# Patient Record
Sex: FEMALE | HISPANIC OR LATINO | Employment: FULL TIME | URBAN - METROPOLITAN AREA
[De-identification: names, ages, dates, MRNs, and addresses within clinical notes are randomized per-mention and may not be internally consistent; named-entity substitution may affect disease eponyms.]

---

## 2017-04-21 ENCOUNTER — APPOINTMENT (OUTPATIENT)
Dept: RADIOLOGY | Facility: MEDICAL CENTER | Age: 62
DRG: 193 | End: 2017-04-21
Attending: EMERGENCY MEDICINE
Payer: MEDICAID

## 2017-04-21 ENCOUNTER — HOSPITAL ENCOUNTER (INPATIENT)
Facility: MEDICAL CENTER | Age: 62
LOS: 4 days | DRG: 193 | End: 2017-04-25
Attending: EMERGENCY MEDICINE | Admitting: HOSPITALIST
Payer: MEDICAID

## 2017-04-21 ENCOUNTER — RESOLUTE PROFESSIONAL BILLING HOSPITAL PROF FEE (OUTPATIENT)
Dept: HOSPITALIST | Facility: MEDICAL CENTER | Age: 62
End: 2017-04-21
Payer: MEDICAID

## 2017-04-21 DIAGNOSIS — J40 BRONCHITIS: ICD-10-CM

## 2017-04-21 DIAGNOSIS — J44.1 COPD WITH EXACERBATION (HCC): ICD-10-CM

## 2017-04-21 DIAGNOSIS — R09.02 HYPOXIA: ICD-10-CM

## 2017-04-21 DIAGNOSIS — F17.200 TOBACCO DEPENDENCE: ICD-10-CM

## 2017-04-21 DIAGNOSIS — J44.1 ACUTE EXACERBATION OF CHRONIC OBSTRUCTIVE PULMONARY DISEASE (COPD) (HCC): ICD-10-CM

## 2017-04-21 LAB
ALBUMIN SERPL BCP-MCNC: 4.1 G/DL (ref 3.2–4.9)
ALBUMIN/GLOB SERPL: 1.3 G/DL
ALP SERPL-CCNC: 50 U/L (ref 30–99)
ALT SERPL-CCNC: 16 U/L (ref 2–50)
ANION GAP SERPL CALC-SCNC: 10 MMOL/L (ref 0–11.9)
ANISOCYTOSIS BLD QL SMEAR: ABNORMAL
AST SERPL-CCNC: 12 U/L (ref 12–45)
BASE EXCESS BLDA CALC-SCNC: -3 MMOL/L (ref -4–3)
BASOPHILS # BLD AUTO: 0 % (ref 0–1.8)
BASOPHILS # BLD: 0 K/UL (ref 0–0.12)
BILIRUB SERPL-MCNC: 0.4 MG/DL (ref 0.1–1.5)
BLOOD CULTURE HOLD CXBCH: NORMAL
BNP SERPL-MCNC: 64 PG/ML (ref 0–100)
BODY TEMPERATURE: ABNORMAL CENTIGRADE
BUN SERPL-MCNC: 17 MG/DL (ref 8–22)
CALCIUM SERPL-MCNC: 8.9 MG/DL (ref 8.5–10.5)
CHLORIDE SERPL-SCNC: 103 MMOL/L (ref 96–112)
CO2 SERPL-SCNC: 25 MMOL/L (ref 20–33)
CREAT SERPL-MCNC: 0.51 MG/DL (ref 0.5–1.4)
EKG IMPRESSION: NORMAL
EOSINOPHIL # BLD AUTO: 0.63 K/UL (ref 0–0.51)
EOSINOPHIL NFR BLD: 6.2 % (ref 0–6.9)
ERYTHROCYTE [DISTWIDTH] IN BLOOD BY AUTOMATED COUNT: 43.4 FL (ref 35.9–50)
GFR SERPL CREATININE-BSD FRML MDRD: >60 ML/MIN/1.73 M 2
GLOBULIN SER CALC-MCNC: 3.2 G/DL (ref 1.9–3.5)
GLUCOSE SERPL-MCNC: 143 MG/DL (ref 65–99)
HCO3 BLDA-SCNC: 24 MMOL/L (ref 17–25)
HCT VFR BLD AUTO: 53 % (ref 37–47)
HGB BLD-MCNC: 17 G/DL (ref 12–16)
LACTATE BLD-SCNC: 1.8 MMOL/L (ref 0.5–2)
LYMPHOCYTES # BLD AUTO: 4.15 K/UL (ref 1–4.8)
LYMPHOCYTES NFR BLD: 40.7 % (ref 22–41)
MAGNESIUM SERPL-MCNC: 2.2 MG/DL (ref 1.5–2.5)
MANUAL DIFF BLD: NORMAL
MCH RBC QN AUTO: 29.5 PG (ref 27–33)
MCHC RBC AUTO-ENTMCNC: 32.1 G/DL (ref 33.6–35)
MCV RBC AUTO: 91.9 FL (ref 81.4–97.8)
MICROCYTES BLD QL SMEAR: ABNORMAL
MONOCYTES # BLD AUTO: 0.72 K/UL (ref 0–0.85)
MONOCYTES NFR BLD AUTO: 7.1 % (ref 0–13.4)
MORPHOLOGY BLD-IMP: NORMAL
NEUTROPHILS # BLD AUTO: 4.69 K/UL (ref 2–7.15)
NEUTROPHILS NFR BLD: 46 % (ref 44–72)
NRBC # BLD AUTO: 0 K/UL
NRBC BLD AUTO-RTO: 0 /100 WBC
PCO2 BLDA: 48.8 MMHG (ref 26–37)
PH BLDA: 7.31 [PH] (ref 7.4–7.5)
PLATELET # BLD AUTO: 201 K/UL (ref 164–446)
PLATELET BLD QL SMEAR: NORMAL
PMV BLD AUTO: 10.7 FL (ref 9–12.9)
PO2 BLDA: 53.4 MMHG (ref 64–87)
POTASSIUM SERPL-SCNC: 3.9 MMOL/L (ref 3.6–5.5)
PROT SERPL-MCNC: 7.3 G/DL (ref 6–8.2)
RBC # BLD AUTO: 5.77 M/UL (ref 4.2–5.4)
RBC BLD AUTO: PRESENT
SAO2 % BLDA: 83.3 % (ref 93–99)
SODIUM SERPL-SCNC: 138 MMOL/L (ref 135–145)
TROPONIN I SERPL-MCNC: <0.01 NG/ML (ref 0–0.04)
TSH SERPL DL<=0.005 MIU/L-ACNC: 1.57 UIU/ML (ref 0.3–3.7)
WBC # BLD AUTO: 10.2 K/UL (ref 4.8–10.8)

## 2017-04-21 PROCEDURE — 83605 ASSAY OF LACTIC ACID: CPT

## 2017-04-21 PROCEDURE — 93005 ELECTROCARDIOGRAM TRACING: CPT

## 2017-04-21 PROCEDURE — 85007 BL SMEAR W/DIFF WBC COUNT: CPT

## 2017-04-21 PROCEDURE — 700101 HCHG RX REV CODE 250: Performed by: STUDENT IN AN ORGANIZED HEALTH CARE EDUCATION/TRAINING PROGRAM

## 2017-04-21 PROCEDURE — 94640 AIRWAY INHALATION TREATMENT: CPT

## 2017-04-21 PROCEDURE — 99285 EMERGENCY DEPT VISIT HI MDM: CPT

## 2017-04-21 PROCEDURE — 94760 N-INVAS EAR/PLS OXIMETRY 1: CPT

## 2017-04-21 PROCEDURE — 82803 BLOOD GASES ANY COMBINATION: CPT

## 2017-04-21 PROCEDURE — 700111 HCHG RX REV CODE 636 W/ 250 OVERRIDE (IP): Performed by: HOSPITALIST

## 2017-04-21 PROCEDURE — 87040 BLOOD CULTURE FOR BACTERIA: CPT | Mod: 91

## 2017-04-21 PROCEDURE — 700111 HCHG RX REV CODE 636 W/ 250 OVERRIDE (IP): Performed by: STUDENT IN AN ORGANIZED HEALTH CARE EDUCATION/TRAINING PROGRAM

## 2017-04-21 PROCEDURE — 700105 HCHG RX REV CODE 258: Performed by: EMERGENCY MEDICINE

## 2017-04-21 PROCEDURE — 83880 ASSAY OF NATRIURETIC PEPTIDE: CPT

## 2017-04-21 PROCEDURE — 770020 HCHG ROOM/CARE - TELE (206)

## 2017-04-21 PROCEDURE — 99223 1ST HOSP IP/OBS HIGH 75: CPT | Mod: GC | Performed by: HOSPITALIST

## 2017-04-21 PROCEDURE — A9270 NON-COVERED ITEM OR SERVICE: HCPCS | Performed by: STUDENT IN AN ORGANIZED HEALTH CARE EDUCATION/TRAINING PROGRAM

## 2017-04-21 PROCEDURE — 84443 ASSAY THYROID STIM HORMONE: CPT

## 2017-04-21 PROCEDURE — 700111 HCHG RX REV CODE 636 W/ 250 OVERRIDE (IP): Performed by: EMERGENCY MEDICINE

## 2017-04-21 PROCEDURE — 93005 ELECTROCARDIOGRAM TRACING: CPT | Performed by: EMERGENCY MEDICINE

## 2017-04-21 PROCEDURE — 84484 ASSAY OF TROPONIN QUANT: CPT

## 2017-04-21 PROCEDURE — 700102 HCHG RX REV CODE 250 W/ 637 OVERRIDE(OP): Performed by: EMERGENCY MEDICINE

## 2017-04-21 PROCEDURE — 85027 COMPLETE CBC AUTOMATED: CPT

## 2017-04-21 PROCEDURE — 87205 SMEAR GRAM STAIN: CPT

## 2017-04-21 PROCEDURE — 83735 ASSAY OF MAGNESIUM: CPT

## 2017-04-21 PROCEDURE — 96365 THER/PROPH/DIAG IV INF INIT: CPT

## 2017-04-21 PROCEDURE — A9270 NON-COVERED ITEM OR SERVICE: HCPCS | Performed by: EMERGENCY MEDICINE

## 2017-04-21 PROCEDURE — 96367 TX/PROPH/DG ADDL SEQ IV INF: CPT

## 2017-04-21 PROCEDURE — 700102 HCHG RX REV CODE 250 W/ 637 OVERRIDE(OP): Performed by: STUDENT IN AN ORGANIZED HEALTH CARE EDUCATION/TRAINING PROGRAM

## 2017-04-21 PROCEDURE — 700105 HCHG RX REV CODE 258: Performed by: STUDENT IN AN ORGANIZED HEALTH CARE EDUCATION/TRAINING PROGRAM

## 2017-04-21 PROCEDURE — 71010 DX-CHEST-PORTABLE (1 VIEW): CPT

## 2017-04-21 PROCEDURE — 80053 COMPREHEN METABOLIC PANEL: CPT

## 2017-04-21 PROCEDURE — 700101 HCHG RX REV CODE 250: Performed by: EMERGENCY MEDICINE

## 2017-04-21 PROCEDURE — 96368 THER/DIAG CONCURRENT INF: CPT

## 2017-04-21 PROCEDURE — 87070 CULTURE OTHR SPECIMN AEROBIC: CPT

## 2017-04-21 PROCEDURE — 304561 HCHG STAT O2

## 2017-04-21 RX ORDER — LABETALOL HYDROCHLORIDE 5 MG/ML
10 INJECTION, SOLUTION INTRAVENOUS EVERY 4 HOURS PRN
Status: DISCONTINUED | OUTPATIENT
Start: 2017-04-21 | End: 2017-04-24

## 2017-04-21 RX ORDER — BISACODYL 10 MG
10 SUPPOSITORY, RECTAL RECTAL
Status: DISCONTINUED | OUTPATIENT
Start: 2017-04-21 | End: 2017-04-25 | Stop reason: HOSPADM

## 2017-04-21 RX ORDER — OXYCODONE HYDROCHLORIDE 5 MG/1
2.5 TABLET ORAL
Status: DISCONTINUED | OUTPATIENT
Start: 2017-04-21 | End: 2017-04-23

## 2017-04-21 RX ORDER — ACETAMINOPHEN 325 MG/1
650 TABLET ORAL EVERY 6 HOURS PRN
Status: DISCONTINUED | OUTPATIENT
Start: 2017-04-21 | End: 2017-04-25 | Stop reason: HOSPADM

## 2017-04-21 RX ORDER — IPRATROPIUM BROMIDE AND ALBUTEROL SULFATE 2.5; .5 MG/3ML; MG/3ML
3 SOLUTION RESPIRATORY (INHALATION)
Status: DISCONTINUED | OUTPATIENT
Start: 2017-04-21 | End: 2017-04-22

## 2017-04-21 RX ORDER — AMOXICILLIN 250 MG
2 CAPSULE ORAL 2 TIMES DAILY
Status: DISCONTINUED | OUTPATIENT
Start: 2017-04-21 | End: 2017-04-25 | Stop reason: HOSPADM

## 2017-04-21 RX ORDER — SODIUM CHLORIDE 9 MG/ML
30 INJECTION, SOLUTION INTRAVENOUS ONCE
Status: COMPLETED | OUTPATIENT
Start: 2017-04-21 | End: 2017-04-21

## 2017-04-21 RX ORDER — SODIUM CHLORIDE 9 MG/ML
INJECTION, SOLUTION INTRAVENOUS CONTINUOUS
Status: DISCONTINUED | OUTPATIENT
Start: 2017-04-21 | End: 2017-04-22

## 2017-04-21 RX ORDER — METHYLPREDNISOLONE SODIUM SUCCINATE 125 MG/2ML
125 INJECTION, POWDER, LYOPHILIZED, FOR SOLUTION INTRAMUSCULAR; INTRAVENOUS EVERY 6 HOURS
Status: DISCONTINUED | OUTPATIENT
Start: 2017-04-22 | End: 2017-04-21

## 2017-04-21 RX ORDER — ALBUTEROL SULFATE 90 UG/1
2 AEROSOL, METERED RESPIRATORY (INHALATION)
Status: DISCONTINUED | OUTPATIENT
Start: 2017-04-21 | End: 2017-04-25 | Stop reason: HOSPADM

## 2017-04-21 RX ORDER — OXYCODONE HYDROCHLORIDE 5 MG/1
5 TABLET ORAL
Status: DISCONTINUED | OUTPATIENT
Start: 2017-04-21 | End: 2017-04-23

## 2017-04-21 RX ORDER — ACETAMINOPHEN 325 MG/1
975 TABLET ORAL ONCE
Status: COMPLETED | OUTPATIENT
Start: 2017-04-21 | End: 2017-04-21

## 2017-04-21 RX ORDER — NICOTINE 21 MG/24HR
21 PATCH, TRANSDERMAL 24 HOURS TRANSDERMAL
Status: DISCONTINUED | OUTPATIENT
Start: 2017-04-22 | End: 2017-04-25 | Stop reason: HOSPADM

## 2017-04-21 RX ORDER — IPRATROPIUM BROMIDE AND ALBUTEROL SULFATE 2.5; .5 MG/3ML; MG/3ML
3 SOLUTION RESPIRATORY (INHALATION)
Status: DISCONTINUED | OUTPATIENT
Start: 2017-04-21 | End: 2017-04-21

## 2017-04-21 RX ORDER — POLYETHYLENE GLYCOL 3350 17 G/17G
1 POWDER, FOR SOLUTION ORAL
Status: DISCONTINUED | OUTPATIENT
Start: 2017-04-21 | End: 2017-04-25 | Stop reason: HOSPADM

## 2017-04-21 RX ORDER — CEFTRIAXONE 1 G/1
1 INJECTION, POWDER, FOR SOLUTION INTRAMUSCULAR; INTRAVENOUS ONCE
Status: COMPLETED | OUTPATIENT
Start: 2017-04-21 | End: 2017-04-21

## 2017-04-21 RX ORDER — METHYLPREDNISOLONE SODIUM SUCCINATE 125 MG/2ML
62.5 INJECTION, POWDER, LYOPHILIZED, FOR SOLUTION INTRAMUSCULAR; INTRAVENOUS EVERY 6 HOURS
Status: DISCONTINUED | OUTPATIENT
Start: 2017-04-22 | End: 2017-04-24

## 2017-04-21 RX ORDER — PREDNISONE 20 MG/1
60 TABLET ORAL ONCE
Status: COMPLETED | OUTPATIENT
Start: 2017-04-21 | End: 2017-04-21

## 2017-04-21 RX ADMIN — METHYLPREDNISOLONE SODIUM SUCCINATE 62.5 MG: 125 INJECTION, POWDER, FOR SOLUTION INTRAMUSCULAR; INTRAVENOUS at 22:36

## 2017-04-21 RX ADMIN — IPRATROPIUM BROMIDE 0.5 MG: 0.5 SOLUTION RESPIRATORY (INHALATION) at 18:09

## 2017-04-21 RX ADMIN — SODIUM CHLORIDE 1974 ML: 9 INJECTION, SOLUTION INTRAVENOUS at 18:25

## 2017-04-21 RX ADMIN — ACETAMINOPHEN 975 MG: 325 TABLET, FILM COATED ORAL at 18:23

## 2017-04-21 RX ADMIN — CEFTRIAXONE 1 G: 1 INJECTION, POWDER, FOR SOLUTION INTRAMUSCULAR; INTRAVENOUS at 18:25

## 2017-04-21 RX ADMIN — IPRATROPIUM BROMIDE AND ALBUTEROL SULFATE 3 ML: .5; 3 SOLUTION RESPIRATORY (INHALATION) at 22:38

## 2017-04-21 RX ADMIN — GUAIFENESIN 200 MG: 100 SOLUTION ORAL at 22:36

## 2017-04-21 RX ADMIN — AZITHROMYCIN MONOHYDRATE 500 MG: 500 INJECTION, POWDER, LYOPHILIZED, FOR SOLUTION INTRAVENOUS at 21:04

## 2017-04-21 RX ADMIN — ALBUTEROL SULFATE 15 MG: 5 SOLUTION RESPIRATORY (INHALATION) at 18:09

## 2017-04-21 RX ADMIN — PREDNISONE 60 MG: 20 TABLET ORAL at 18:24

## 2017-04-21 RX ADMIN — SODIUM CHLORIDE: 9 INJECTION, SOLUTION INTRAVENOUS at 22:37

## 2017-04-21 RX ADMIN — DOXYCYCLINE 100 MG: 100 INJECTION, POWDER, LYOPHILIZED, FOR SOLUTION INTRAVENOUS at 18:59

## 2017-04-21 RX ADMIN — STANDARDIZED SENNA CONCENTRATE AND DOCUSATE SODIUM 2 TABLET: 8.6; 5 TABLET, FILM COATED ORAL at 22:36

## 2017-04-21 ASSESSMENT — ENCOUNTER SYMPTOMS
DIZZINESS: 1
ABDOMINAL PAIN: 0
CONSTIPATION: 0
SHORTNESS OF BREATH: 1
BLOOD IN STOOL: 0
VOMITING: 0
MYALGIAS: 1
WEAKNESS: 0
ORTHOPNEA: 0
WHEEZING: 0
BLURRED VISION: 0
DIARRHEA: 0
LOSS OF CONSCIOUSNESS: 0
HEMOPTYSIS: 0
COUGH: 1
SORE THROAT: 0
FEVER: 1
CHILLS: 0
SEIZURES: 0
HEADACHES: 0
NAUSEA: 0
FOCAL WEAKNESS: 0
DIAPHORESIS: 0
HEARTBURN: 0
PALPITATIONS: 0
WEIGHT LOSS: 0
TINGLING: 0
SPUTUM PRODUCTION: 1

## 2017-04-21 ASSESSMENT — PAIN SCALES - GENERAL
PAINLEVEL_OUTOF10: 10
PAINLEVEL_OUTOF10: 0

## 2017-04-21 ASSESSMENT — PATIENT HEALTH QUESTIONNAIRE - PHQ9
SUM OF ALL RESPONSES TO PHQ9 QUESTIONS 1 AND 2: 0
1. LITTLE INTEREST OR PLEASURE IN DOING THINGS: NOT AT ALL
SUM OF ALL RESPONSES TO PHQ QUESTIONS 1-9: 0
2. FEELING DOWN, DEPRESSED, IRRITABLE, OR HOPELESS: NOT AT ALL

## 2017-04-21 ASSESSMENT — COPD QUESTIONNAIRES
DO YOU EVER COUGH UP ANY MUCUS OR PHLEGM?: NO/ONLY WITH OCCASIONAL COLDS OR INFECTIONS
DURING THE PAST 4 WEEKS HOW MUCH DID YOU FEEL SHORT OF BREATH: NONE/LITTLE OF THE TIME
HAVE YOU SMOKED AT LEAST 100 CIGARETTES IN YOUR ENTIRE LIFE: YES
COPD SCREENING SCORE: 4

## 2017-04-21 ASSESSMENT — LIFESTYLE VARIABLES
SUBSTANCE_ABUSE: 0
PACK_YEARS: 45
EVER_SMOKED: YES
EVER_SMOKED: YES
DO YOU DRINK ALCOHOL: NO
DO YOU DRINK ALCOHOL: NO

## 2017-04-21 NOTE — IP AVS SNAPSHOT
4/25/2017    Radha Guerin  [address]  [place] [address]    Dear Radha:    [facility] wants to ensure your discharge home is safe and you or your loved ones have had all of your questions answered regarding your care after you leave the hospital.    Below is a list of resources and contact information should you have any questions regarding your hospital stay, follow-up instructions, or active medical symptoms.    Questions or Concerns Regarding… Contact   Medical Questions Related to Your Discharge  (7 days a week, 8am-5pm) Contact a Nurse Care Coordinator   [phone]   Medical Questions Not Related to Your Discharge  (24 hours a day / 7 days a week)  Contact the Nurse Health Line   [phone]    Medications or Discharge Instructions Refer to your discharge packet   or contact your [facility] Primary Care Provider   [phone]   Follow-up Appointment(s) Schedule your appointment via [employer]   or contact Scheduling [phone]   Billing Review your statement via [employer]  or contact Billing [phone]   Medical Records Review your records via [employer]   or contact Medical Records [phone]     You may receive a telephone call within two days of discharge. This call is to make certain you understand your discharge instructions and have the opportunity to have any questions answered. You can also easily access your medical information, test results and upcoming appointments via the [employer] free online health management tool. You can learn more and sign up at [email or web address]. For assistance setting up your [employer] account, please call [phone].    Once again, we want to ensure your discharge home is safe and that you have a clear understanding of any next steps in your care. If you have any questions or concerns, please do not hesitate to contact us, we are here for you. Thank you for choosing [facility] for your healthcare needs.    Sincerely,    Your [facility] Healthcare Team

## 2017-04-21 NOTE — IP AVS SNAPSHOT
Home Care Instructions                                                                                                                  Name:Radha Guerin  Medical Record Number:[id number]  CSN: [id number]    YOB: 1955   Age: 61 y.o.  Sex: female  HT:1.524 m (5') WT: 65.1 kg (143 lb 8.3 oz)          Admit Date: 4/21/2017     Discharge Date:   Today's Date: 4/25/2017  Attending Doctor:  Marie Lama Team Callister*                  Allergies:  Review of patient's allergies indicates no known allergies.            Discharge Instructions       Discharge Instructions    Discharged to home by car with relative. Discharged via wheelchair, hospital escort: Yes.  Special equipment needed: Oxygen    Be sure to schedule a follow-up appointment with your primary care doctor or any specialists as instructed.     Discharge Plan:   Diet Plan: Discussed  Activity Level: Discussed  Smoking Cessation Offered: Patient Counseled  Confirmed Follow up Appointment: Patient to Call and Schedule Appointment  Confirmed Symptoms Management: Discussed  Medication Reconciliation Updated: Yes  Influenza Vaccine Indication: Patient Refuses    I understand that a diet low in cholesterol, fat, and sodium is recommended for good health. Unless I have been given specific instructions below for another diet, I accept this instruction as my diet prescription.   Other diet: Diabetic    Special Instructions: None    · Is patient discharged on Warfarin / Coumadin?   No     · Is patient Post Blood Transfusion?  No    Cómo evitar los problemas relacionados con la diabetes  (How to Avoid Diabetes Problems)  Usted puede hacer varias cosas para prevenir o disminuir los problemas relacionados con la diabetes. Seguir un plan para la diabetes y cuidarse usted mismo puede reducir el riesgo de complicaciones graves o potencialmente mortales. A continuación, encontrará algunas cosas que puede hacer para prevenir los problemas de la diabetes.  CONTROLE LA  DIABETES  Siga las instrucciones de hood médico, enfermera educadora en diabetes y nutricionista para controlar la enfermedad. Le enseñarán los fundamentos para el cuidado de la diabetes. Le ayudar con las preguntas que pueda tener. Aprenda acerca de la diabetes y a ayesha decisiones saludables en materia de alimentación y actividad física. Controle hood nivel de glucosa en la kane con regularidad. El médico le ayudará a decidir con qué frecuencia debe revisar hood nivel de glucosa en la kane, en función de los objetivos de hood tratamiento y el éxito en cumplirlos.   NO USE NICOTINA  La nicotina y la diabetes son maxine combinación peligrosa. La nicotina aumenta el riesgo de problemas con la diabetes. Si stefani de usar nicotina, reducirá el riesgo de infarto de miocardio, ictus, enfermedades del sistema nervioso y enfermedades renales. Pueden mejorar el colesterol y sylvie niveles de presión arterial. La circulación sanguínea mejorará también. No consuma ningún producto que contenga tabaco, incluidos cigarrillos, tabaco de mascar o cigarrillos electrónicos. Si necesita ayuda para dejar de fumar, hable con el médico.  MANTENGA HOOD PRESIÓN ARTERIAL BAJO CONTROL  El médico determinará cuál debería ser hood presión arterial en función de hood edad, los medicamentos que consuma, el tiempo que hace que tiene diabetes y cualquier otra enfermedad que padezca. La presión arterial consiste en dos números. En general, el objetivo es mantener el número de arriba (presión sistólica) en un valor clifford de 130 y el número de abajo (presión diastólica) en un valor cilfford de 80. Si corresponde, el médico recomendará un objetivo de presión arterial con valores más bajos. La planificación de comidas, los medicamentos y el ejercicio pueden ayudarle a alcanzar sylvie objetivos. Asegúrese de que el médico le mida la presión arterial en cada visita.  MANTENGA LOS NIVELES DE COLESTEROL BAJO CONTROL  Los niveles normales de colesterol ayudan a prevenir  enfermedades del corazón y el ictus. Estos son los mayores problemas de jaswinder para las personas con diabetes. Mantener los niveles de colesterol bajo control también puede ayudar con el flujo sanguíneo. Controle hood nivel de colesterol por lo menos maxine vez al año. Hood médico puede recetarle un medicamento llamado estatina. Las estatinas reducen el colesterol. Si no está tomando maxine estatina, pregúntele a hood médico si debería tomarla. La planificación de comidas, el ejercicio y los medicamentos pueden ayudar a alcanzar sylvie objetivos relacionados con el nivel de colesterol.   PLANIFIQUE Y CUMPLA CON SYLVIE EXAMENES FISICOS Y OCULARES ANUALES  El médico le dirá la frecuencia con la que quiere controlarlo en función de hood plan de tratamiento. Es importante que cumpla con estos controles para identificar rápidamente posibles problemas y puedan evitarse o tratarse las complicaciones.  · En cada visita, hood médico debe pesarlo, medirle la presión arterial y evaluar hood control del nivel de glucosa.  · La hemoglobina A1c debe controlarse:  · Por lo menos dos veces al año si usted está en el nivel adecuado.  · Cada 3 meses si hay cambios en el tratamiento.  · Si usted no está alcanzando sylvie objetivos.  · Los lípidos de la kane deben controlarse anualmente. También hay que controlar anualmente la presencia de proteínas en la orina (microalbuminuria).  · Si tiene diabetes tipo 1, programe un examen de fondo de ojos en el período de 5 años a partir del diagnóstico y después maxine vez al año. Si tiene diabetes tipo 2, programe un examen de fondo de ojos cuando reciba el diagnóstico y después maxine vez al año. Los exámenes posteriores deben hacerse cada 2 o 3 años si trista o más exámenes haji sido normales.  MANTÉNGASE AL DÍA CON LAS VACUNAS  Se recomienda que se vacune contra la gripe todos los años. Además, que se vacune contra la neumonía (vacuna antineumocócica). Si es mayor de 65 años y nunca se vacunó contra la neumonía, esta vacuna  puede administrarse stephen maxine serie de dos vacunas por separado. Pregúntele al médico qué otras vacunas se pueden recomendar.  CUIDE FRANCES PIES   La diabetes puede hacer que el flujo sanguíneo (circulación) en las piernas y los pies sea deficiente. Debido a esto, la piel se torna más delgada, se rompe con facilidad y se jose más lentamente. También puede sufrir un daño en los nervios de las piernas y los pies, lo que disminuye la sensibilidad. Es posible que no advierta las heridas más pequeñas que pueden conducir a infecciones graves. El cuidado de los pies es muy importante.  Se hará maxine inspección visual en cada visita médica de rutina. Estos controles observarán si hay li, lesiones u otros problemas en los pies. Maxine vez por año debe hacerse un examen más intensivo. Phyllis examen incluye la inspección visual y maxine evaluación de los pulsos del pie y la sensibilidad. Usted también debe hacer lo siguiente:  · Examine frances pies todos los días para detectar li, ampollas, callos, uñas encarnadas, y signos de infección, tales stephen enrojecimiento, hinchazón o pus.  · Lave y seque cindy los pies, especialmente entre los dedos.  · Evite sumergir frances pies regularmente en [place].  · Hidrate la piel seca con loción, evitando colocarla entre los dedos.  · Córtese las uñas en línea recta y lime los bordes.  · Evite los zapatos que no calzan cindy o tienen áreas que irritan la piel.  · Evite ir descalzo o con calcetines solamente. Frances pies necesitan protección.  CUIDE FRANCES DIENTES  Las personas con diabetes mal controlada son más propensas a tener enfermedades en las encías (periodontales). Estas infecciones hacen que la diabetes sea difícil de controlar. Las enfermedades periodontales, si se jony sin tratamiento, pueden conducir a la pérdida de dientes. [place] frances dientes dos veces al día, use hilo dental y visite al dentista para los controles y limpieza cada 6 meses o 2 veces al año.  CONSULTE A HELLER MÉDICO SOBRE EL  CONSUMO DE ASPIRINA  Jaclyn aspirina a diario se recomienda para ayudar a prevenir la enfermedad cardiovascular en personas con y sin diabetes. Pregúntele a hood médico si esto lo beneficiaría y cuál es la dosis que le recomienda.  SURJIT DE MANERA RESPONSABLE  Las cantidades moderadas de alcohol (menos de 1 bebida al día para mujeres adultas y menores de 2 bebidas al día para hombres adultos) tienen un mínimo efecto sobre la glucosa en la kane si se ingiere con los alimentos. Es importante comer alimentos cuando se shyam alcohol para evitar la hipoglucemia. Las personas deben evitar el alcohol si tienen un historial de consumo excesivo o dependencia, si es maxine kaycee embarazada, y si tiene maxine enfermedad hepática, pancreatitis, neuropatía avanzada, o hipertrigliceridemia grave.  DISMINUYA EL NIVEL DE ESTRÉS  Vivir con diabetes puede ser estresante. Cuando usted está bajo estrés, el nivel de glucosa en la kane puede verse afectada de dos maneras:  · Las hormonas del estrés pueden hacer que la glucosa en la kane se eleve.  · Probablemente no se cuidó lo suficiente.  Es maxine buena idea estar al tanto del nivel de estrés y hacer los cambios que fifi necesarios para ayudar a manejar mejor las situaciones difíciles. Los grupos de apoyo, maxine relajación planificada, un pasatiempo que le guste, la meditación, las relaciones saludables y hacer ejercicio son factores que ayudan a reducir el nivel de estrés. Si sylvie esfuerzos no parecen tener buenos resultados, pídale ayuda a hood médico o a un profesional de la jaswinder mental capacitado.     Esta información no tiene stephen fin reemplazar el consejo del médico. Asegúrese de hacerle al médico cualquier pregunta que tenga.     Document Released: 12/06/2012 Document Revised: 01/08/2016  Elsevier Interactive Patient Education ©2016 Elsevier Inc.  Enfermedad pulmonar obstructiva crónica  (Chronic Obstructive Pulmonary Disease)  La enfermedad pulmonar obstructiva crónica (EPOC) es maxine  enfermedad pulmonar común en la que el flujo de aire que proviene de los pulmones está restringido. EPOC es un término general que puede utilizarse para describir muchas enfermedades pulmonares distintas que restringen el flujo de aire, incluidos bronquitis crónica y enfisema. Si tiene EPOC, el funcionamiento de sylvie pulmones probablemente nunca vuelva a ser normal, katherine puede ayesha medidas para mejorar la función pulmonar y comenzar a sentirse mejor.  CAUSAS   · Fumar (común).  · Exposición al humo stephen fumador pasivo.  · Problemas genéticos.  · Enfermedades pulmonares inflamatorias crónicas o infecciones recurrentes.  SÍNTOMAS  · Falta de aire, especialmente al realizar actividad física.  · Tos profunda, persistente (crónica) con gran producción de mucosidad espesa.  · Sibilancias.  · Respiraciones rápidas (taquipnea).  · Coloración daria o azulada de la piel (cianosis), especialmente en los dedos de las alyse, de los pies o en los labios.  · Fatiga.  · Pérdida de peso.  · Infecciones frecuentes o episodios en los que los síntomas respiratorios empeoran mucho (exacerbaciones).  · Opresión en el pecho.  DIAGNÓSTICO  El médico le hará maxine historia clínica y un examen físico para diagnosticar la EPOC. Las pruebas adicionales para la EPOC incluyen lo siguiente:  · Pruebas de la función de los pulmones (pulmonar).  · Radiografía de tórax.  · Tomografía computarizada.  · Análisis de kane.  TRATAMIENTO   El tratamiento para la EPOC puede incluir lo siguiente:  · Inhaladores y nebulizadores. Estos ayudan a controlar los síntomas de EPOC y hacen que la respiración sea más cómoda.  · Oxígeno complementario. El oxígeno complementario solo es útil si tiene un nivel bajo de oxígeno en la kane.  · Ejercicios y actividad física. Estos son beneficiosos para prácticamente todas las personas con EPOC.  · Cirugía o trasplante de pulmón.  · Tratamiento nutricional para aumentar de peso, si tiene bajo peso.  · Rehabilitación  pulmonar. Esta puede incluir el trabajo con un equipo de médicos y especialistas, stephen terapeutas respiratorios y ocupacionales, y fisioterapeutas.  INSTRUCCIONES PARA EL CUIDADO EN EL HOGAR  · [place] todos los medicamentos (inhalados o píldoras) stephen le indicó el médico.  · Evite los medicamentos de venta oralai o los jarabes para la tos que secan las vías respiratorias (stephen los antihistamínicos) y retardan la eliminación de las secreciones, a menos que el médico le haya indicado lo contrario.  · Si es fumador, lo más importante que puede hacer es dejar el hábito. Si continúa fumando, el daño a los pulmones será mayor y habrá más problemas respiratorios. Pídale ayuda al médico, si es necesario. Él podrá recomendarle recursos u hospitales comunitarios que brindan apoyo.  · Evite la exposición a factores irritantes, stephen el humo, productos químicos y vapores, porque pueden agravar el trastorno.  · Use la terapia con oxígeno y la rehabilitación pulmonar si se lo indica hood médico. Si requiere terapia con oxígeno en hood casa, consulte al médico si debe comprar un pulsioxímetro para medir hood nivel de oxígeno en casa.  · Evite el contacto con personas que tengan maxine enfermedad contagiosa.  · Evite los cambios extremos de temperatura y humedad.  · Consuma alimentos saludables. Consumir porciones más pequeñas y frecuentes y descansar antes de las comidas puede ayudar a mantener hood fuerza.  · Manténgase activo, katherine equilibre la actividad con períodos de descanso. La práctica de ejercicios y actividad física lo ayudará a mantener la capacidad de hacer las cosas que desea hacer.  · Si tiene EPOC, es muy importante prevenir la infección y la hospitalización. Asegúrese de recibir todas las vacunas que le recomiende el médico, especialmente las vacunas contra el neumococo y la gripe. Pregúntele a hood médico si necesita aplicarse maxine vacuna contra la neumonía.  · Aprenda y utilice técnicas de relajación para controlar el  estrés.  · Aprenda y utilice técnicas de respiración controlada según las indicaciones de hood médico. Las técnicas de respiración controlada incluyen lo siguiente:  ¨ Respiración con los labios fruncidos. Comience inspirando (inhalando) por la nariz tete 1 ingrid. Luego frunza los labios stephen si fuera a silbar y espire (exhale) por los labios fruncidos tete 2 segundos.  ¨ Respiración diafragmática. Comience colocando maxine mano sobre el abdomen, emeterio por encima de la cintura. Inhale lentamente por la nariz. La mano que se encuentra sobre el abdomen debe moverse. Luego frunza los labios y exhale lentamente. Tiene que sentir que la mano que está sobre el abdomen se mueve al exhalar.  · Aprenda y utilice la tos controlada para despejar la mucosidad de los pulmones. La tos controlada consiste en realizar maxine serie de toses cortas y progresivas. Los pasos para la tos controlada son:  1. Incline la manolo ligeramente hacia adelante.  2. Inspire profundamente utilizando la respiración diafragmática.  3. Trate de mantener el aire por 3 segundos.  4. Mantenga la boca ligeramente abierta mientras tose dos veces.  5. Escupa el moco en un pañuelo.  6. Descanse y repita los pasos maxine o dos veces según sea necesario.  SOLICITE ATENCIÓN MÉDICA SI:  · Tose y elimina más moco que lo habitual.  · Hay un cambio en el color o en la consistencia del moco.  · Le antonio respirar más de lo normal.  · La respiración es más rápida que lo habitual.  SOLICITE ATENCIÓN MÉDICA DE INMEDIATO SI:  · Tiene dificultad para respirar mientras está en reposo.  · Tiene dificultad para respirar y esto le impide hacer lo siguiente:  ¨ Poder hablar.  ¨ Realizar las actividades físicas habituales.  · Siente un dolor de pecho que dura más de 5 minutos.  · Tiene la piel más cianótica.  · El pulsioxímetro detecta saturaciones de oxígeno bajas tete más de 5 minutos.  ASEGÚRESE DE QUE:  · Comprende estas instrucciones.  · Controlará hood  afección.  · Recibirá ayuda de inmediato si no mejora o si empeora.     Esta información no tiene stephen fin reemplazar el consejo del médico. Asegúrese de hacerle al médico cualquier pregunta que tenga.     Document Released: 09/27/2006 Document Revised: 01/08/2016  [employer] Interactive Patient Education ©2016 Elsevier Inc.    Depression / Suicide Risk    As you are discharged from this [facility] facility, it is important to learn how to keep safe from harming yourself.    Recognize the warning signs:  · Abrupt changes in personality, positive or negative- including increase in energy   · Giving away possessions  · Change in eating patterns- significant weight changes-  positive or negative  · Change in sleeping patterns- unable to sleep or sleeping all the time   · Unwillingness or inability to communicate  · Depression  · Unusual sadness, discouragement and loneliness  · Talk of wanting to die  · Neglect of personal appearance   · Rebelliousness- reckless behavior  · Withdrawal from people/activities they love  · Confusion- inability to concentrate     If you or a loved one observes any of these behaviors or has concerns about self-harm, here's what you can do:  · Talk about it- your feelings and reasons for harming yourself  · Remove any means that you might use to hurt yourself (examples: pills, rope, extension cords, firearm)  · Get professional help from the community (Mental Health, Substance Abuse, psychological counseling)  · Do not be alone:Call your Safe Contact- someone whom you trust who will be there for you.  · Call your local CRISIS HOTLINE 798-6575 or 880-719-1846  · Call your local Children's Mobile Crisis Response Team [place] (837) 958-4491 or www.[employer]  · Call the toll free National Suicide Prevention Hotlines   · National Suicide Prevention Lifeline 916-760-VYIB (0630)  · National Hope Line Network 800-SUICIDE (575-3108)           Discharge Medication Instructions:    Below  are the medications your physician expects you to take upon discharge:    Review all your home medications and newly ordered medications with your doctor and/or pharmacist. Follow medication instructions as directed by your doctor and/or pharmacist.    Please keep your medication list with you and share with your physician.               Medication List      START taking these medications        Instructions    Morning Afternoon Evening Bedtime    albuterol 108 (90 BASE) MCG/ACT Aers inhalation aerosol   Last time this was given:  2 Puffs on 4/23/2017  1:11 AM   Next Dose Due:  As needed for shortness of breath        Inhale 2 Puffs by mouth every 6 hours as needed for Shortness of Breath.   Dose:  2 Puff                        amlodipine 5 MG Tabs   Last time this was given:  5 mg on 4/25/2017  7:25 AM   Commonly known as:  NORVASC   Next Dose Due:  4/26 AM        Take 1 Tab by mouth every day.   Dose:  5 mg                        azithromycin 250 MG Tabs   Last time this was given:  250 mg on 4/25/2017  3:25 PM   Commonly known as:  ZITHROMAX   Next Dose Due:  4/26 AM        Take 1 Tab by mouth every day.   Dose:  250 mg                        budesonide-formoterol 160-4.5 MCG/ACT Aero   Last time this was given:  2 Puffs on 4/25/2017  3:25 PM   Commonly known as:  SYMBICORT   Next Dose Due:  2 times a day        Inhale 2 Puffs by mouth 2 Times a Day for 30 days.   Dose:  2 Puff                        cefdinir 300 MG Caps   Last time this was given:  300 mg on 4/25/2017  8:02 AM   Commonly known as:  OMNICEF   Next Dose Due:  4/26 AM        Take 1 Cap by mouth 2 times a day for 5 days.   Dose:  300 mg                        guaiFENesin 100 MG/5ML Soln   Last time this was given:  200 mg on 4/22/2017  8:39 PM   Commonly known as:  ROBITUSSIN   Next Dose Due:  As needed for cough        Take 10 mL by mouth every four hours as needed for Cough.   Dose:  10 mL                        nicotine 21 MG/24HR Pt24   Last  time this was given:  21 mg on 4/25/2017  5:32 AM   Commonly known as:  NICODERM   Next Dose Due:  4/26         Apply 1 Patch to skin as directed every 24 hours.   Dose:  1 Patch                        nicotine polacrilex 2 MG Gum   Commonly known as:  NICORETTE   Next Dose Due:  4/26         Take 1 Each by mouth every 1 hour as needed (For nicotine urge).   Dose:  2 mg                        predniSONE 20 MG Tabs   Last time this was given:  40 mg on 4/25/2017  8:02 AM   Commonly known as:  DELTASONE   Next Dose Due:  See directions        Doctor's comments:  Takes 30 mg for 2 days then 20 mg for 2 days, then  10 mg for 2 days, 5 mg for 2 days then 5 mg every other day and then stop   Takes 30 mg for 2 days then 20 mg for 2 days, then  10 mg for 2 days, 5 mg for 2 days then 5 mg every other day and then stop                        tiotropium 18 MCG Caps   Last time this was given:  1 Cap on 4/25/2017  3:25 PM   Commonly known as:  SPIRIVA   Next Dose Due:  4/26         Inhale 1 Cap by mouth every day.   Dose:  18 mcg                             Where to Get Your Medications      You can get these medications from any pharmacy     Bring a paper prescription for each of these medications    - albuterol 108 (90 BASE) MCG/ACT Aers inhalation aerosol  - amlodipine 5 MG Tabs  - azithromycin 250 MG Tabs  - budesonide-formoterol 160-4.5 MCG/ACT Aero  - cefdinir 300 MG Caps  - guaiFENesin 100 MG/5ML Soln  - nicotine 21 MG/24HR Pt24  - nicotine polacrilex 2 MG Gum  - predniSONE 20 MG Tabs  - tiotropium 18 MCG Caps            Orders for after discharge     DME O2 New Set Up    Complete by:  As directed              Instructions           Diet / Nutrition:    Follow any diet instructions given to you by your doctor or the dietician, including how much salt (sodium) you are allowed each day.    If you are overweight, talk to your doctor about a weight reduction plan.    Activity:    Remain physically active following your  doctor's instructions about exercise and activity.    Rest often.     Any time you become even a little tired or short of breath, SIT DOWN and rest.    Worsening Symptoms:    Report any of the following signs and symptoms to the doctor's office immediately:    *Pain of jaw, arm, or neck  *Chest pain not relieved by medication                               *Dizziness or loss of consciousness  *Difficulty breathing even when at rest   *More tired than usual                                       *Bleeding drainage or swelling of surgical site  *Swelling of feet, ankles, legs or stomach                 *Fever (>100ºF)  *Pink or blood tinged sputum  *Weight gain (3lbs/day or 5lbs /week)           *Shock from internal defibrillator (if applicable)  *Palpitations or irregular heartbeats                *Cool and/or numb extremities    Stroke Awareness    Common Risk Factors for Stroke include:    Age  Atrial Fibrillation  Carotid Artery Stenosis  Diabetes Mellitus  Excessive alcohol consumption  High blood pressure  Overweight   Physical inactivity  Smoking    Warning signs and symptoms of a stroke include:    *Sudden numbness or weakness of the face, arm or leg (especially on one side of the body).  *Sudden confusion, trouble speaking or understanding.  *Sudden trouble seeing in one or both eyes.  *Sudden trouble walking, dizziness, loss of balance or coordination.Sudden severe headache with no known cause.    It is very important to get treatment quickly when a stroke occurs. If you experience any of the above warning signs, call 911 immediately.                   Disclaimer         Quit Smoking / Tobacco Use:    I understand the use of any tobacco products increases my chance of suffering from future heart disease or stroke and could cause other illnesses which may shorten my life. Quitting the use of tobacco products is the single most important thing I can do to improve my health. For further information on smoking /  tobacco cessation call a Toll Free Quit Line at 1-985.367.8775 (*[employer]) or 1-497.156.6327 (American Lung Association) or you can access the web based program at www.lungusa.org.    [place] Tobacco Users Help Line:  (811) 647-5754       Toll Free: 1-878.984.1724  Quit Tobacco Program [facility] (628)194-7474    Crisis Hotline:    [place] Crisis Hotline:  5-500-GGGKBUW or 1-159.923.5490    [place] Crisis Hotline:    1-520.618.2761 or 587-164-4659    Discharge Survey:   Thank you for choosing [facility]. We hope we did everything we could to make your hospital stay a pleasant one. You may be receiving a phone survey and we would appreciate your time and participation in answering the questions. Your input is very valuable to us in our efforts to improve our service to our patients and their families.        My signature on this form indicates that:    1. I have reviewed and understand the above information.  2. My questions regarding this information have been answered to my satisfaction.  3. I have formulated a plan with my discharge nurse to obtain my prescribed medications for home.                  Disclaimer         __________________________________                     __________       ________                       Patient Signature                                                 Date                    Time

## 2017-04-21 NOTE — IP AVS SNAPSHOT
Fannect Access Code: [email or web address]  Expires: 5/25/2017  4:37 PM    Your email address is not on file at [facility].  Email Addresses are required for you to sign up for Fannect, please contact [phone] to verify your personal information and to provide your email address prior to attempting to register for Fannect.    Radha Guerin  [address]  [place] [address]    Pembe Panjurt  A secure, online tool to manage your health information     [facility]’s Fannect® is a secure, online tool that connects you to your personalized health information from the privacy of your home -- day or night - making it very easy for you to manage your healthcare. Once the activation process is completed, you can even access your medical information using the Fannect kentrell, which is available for free in the Apple Kentrell store or Google Play store.     To learn more about Fannect, visit [email or web address]    There are two levels of access available (as shown below):   My Chart Features  [facility] Primary Care Doctor [facility]  Specialists [facility]  Urgent  Care Non-[facility] Primary Care Doctor   Email your healthcare team securely and privately 24/7 X X X    Manage appointments: schedule your next appointment; view details of past/upcoming appointments X      Request prescription refills. X      View recent personal medical records, including lab and immunizations X X X X   View health record, including health history, allergies, medications X X X X   Read reports about your outpatient visits, procedures, consult and ER notes X X X X   See your discharge summary, which is a recap of your hospital and/or ER visit that includes your diagnosis, lab results, and care plan X X  X     How to register for Pembe Panjurt:  Once your e-mail address has been verified, follow the following steps to sign up for Pembe Panjurt.     1. Go to  [email or web address]  2. Click on the Sign Up Now box, which takes you to the New Member Sign Up page. You will  need to provide the following information:  a. Enter your Compressus Access Code exactly as it appears at the top of this page. (You will not need to use this code after you’ve completed the sign-up process. If you do not sign up before the expiration date, you must request a new code.)   b. Enter your date of birth.   c. Enter your home email address.   d. Click Submit, and follow the next screen’s instructions.  3. Create a Vanut ID. This will be your Compressus login ID and cannot be changed, so think of one that is secure and easy to remember.  4. Create a Compressus password. You can change your password at any time.  5. Enter your Password Reset Question and Answer. This can be used at a later time if you forget your password.   6. Enter your e-mail address. This allows you to receive e-mail notifications when new information is available in Comp[employer].  7. Click Sign Up. You can now view your health information.    For assistance activating your Compressus account, call [phone]

## 2017-04-21 NOTE — IP AVS SNAPSHOT
" <p align=\"LEFT\"><IMG SRC=\"[email or web address]\" alt=\"Image\" WIDTH=\"50%\" HEIGHT=\"200\" BORDER=\"\"></p>                   Name:Radha Guerin  Medical Record Number:[id number]  CSN: [id number]    YOB: 1955   Age: 61 y.o.  Sex: female  HT:1.524 m (5') WT: 65.1 kg (143 lb 8.3 oz)          Admit Date: 4/21/2017     Discharge Date:   Today's Date: 4/25/2017  Attending Doctor:  Marie Medina Callister*                  Allergies:  Review of patient's allergies indicates no known allergies.             Medication List      Take these Medications        Instructions    albuterol 108 (90 BASE) MCG/ACT Aers inhalation aerosol    Inhale 2 Puffs by mouth every 6 hours as needed for Shortness of Breath.   Dose:  2 Puff       amlodipine 5 MG Tabs   Commonly known as:  NORVASC    Take 1 Tab by mouth every day.   Dose:  5 mg       azithromycin 250 MG Tabs   Commonly known as:  ZITHROMAX    Take 1 Tab by mouth every day.   Dose:  250 mg       budesonide-formoterol 160-4.5 MCG/ACT Aero   Commonly known as:  SYMBICORT    Inhale 2 Puffs by mouth 2 Times a Day for 30 days.   Dose:  2 Puff       cefdinir 300 MG Caps   Commonly known as:  OMNICEF    Take 1 Cap by mouth 2 times a day for 5 days.   Dose:  300 mg       guaiFENesin 100 MG/5ML Soln   Commonly known as:  ROBITUSSIN    Take 10 mL by mouth every four hours as needed for Cough.   Dose:  10 mL       nicotine 21 MG/24HR Pt24   Commonly known as:  NICODERM    Apply 1 Patch to skin as directed every 24 hours.   Dose:  1 Patch       nicotine polacrilex 2 MG Gum   Commonly known as:  NICORETTE    Take 1 Each by mouth every 1 hour as needed (For nicotine urge).   Dose:  2 mg       predniSONE 20 MG Tabs   Commonly known as:  DELTASONE    Doctor's comments:  Takes 30 mg for 2 days then 20 mg for 2 days, then  10 mg for 2 days, 5 mg for 2 days then 5 mg every other day and then stop   Takes 30 mg for 2 days then 20 mg for 2 days, then  10 mg for 2 days, 5 mg for 2 days " then 5 mg every other day and then stop       tiotropium 18 MCG Caps   Commonly known as:  SPIRIVA    Inhale 1 Cap by mouth every day.   Dose:  18 mcg

## 2017-04-22 PROBLEM — R73.9 HYPERGLYCEMIA: Status: ACTIVE | Noted: 2017-04-22

## 2017-04-22 PROBLEM — J96.01 ACUTE RESPIRATORY FAILURE WITH HYPOXIA AND HYPERCARBIA (HCC): Status: ACTIVE | Noted: 2017-04-22

## 2017-04-22 PROBLEM — J18.9 PNEUMONIA: Status: ACTIVE | Noted: 2017-04-22

## 2017-04-22 PROBLEM — R91.1 LUNG NODULE: Status: ACTIVE | Noted: 2017-04-22

## 2017-04-22 PROBLEM — J96.02 ACUTE RESPIRATORY ACIDOSIS (HCC): Status: ACTIVE | Noted: 2017-04-22

## 2017-04-22 PROBLEM — J96.02 ACUTE RESPIRATORY FAILURE WITH HYPOXIA AND HYPERCARBIA (HCC): Status: ACTIVE | Noted: 2017-04-22

## 2017-04-22 PROBLEM — Z72.0 TOBACCO ABUSE: Status: ACTIVE | Noted: 2017-04-22

## 2017-04-22 LAB
ALBUMIN SERPL BCP-MCNC: 3.6 G/DL (ref 3.2–4.9)
ALBUMIN/GLOB SERPL: 1.1 G/DL
ALP SERPL-CCNC: 44 U/L (ref 30–99)
ALT SERPL-CCNC: 17 U/L (ref 2–50)
ANION GAP SERPL CALC-SCNC: 10 MMOL/L (ref 0–11.9)
ANISOCYTOSIS BLD QL SMEAR: ABNORMAL
AST SERPL-CCNC: 12 U/L (ref 12–45)
BASE EXCESS BLDA CALC-SCNC: -2 MMOL/L (ref -4–3)
BASOPHILS # BLD AUTO: 0 % (ref 0–1.8)
BASOPHILS # BLD: 0 K/UL (ref 0–0.12)
BILIRUB SERPL-MCNC: 0.3 MG/DL (ref 0.1–1.5)
BNP SERPL-MCNC: 229 PG/ML (ref 0–100)
BODY TEMPERATURE: ABNORMAL CENTIGRADE
BUN SERPL-MCNC: 10 MG/DL (ref 8–22)
CALCIUM SERPL-MCNC: 8.5 MG/DL (ref 8.5–10.5)
CHLORIDE SERPL-SCNC: 107 MMOL/L (ref 96–112)
CO2 SERPL-SCNC: 26 MMOL/L (ref 20–33)
CREAT SERPL-MCNC: 0.44 MG/DL (ref 0.5–1.4)
EOSINOPHIL # BLD AUTO: 0 K/UL (ref 0–0.51)
EOSINOPHIL NFR BLD: 0 % (ref 0–6.9)
ERYTHROCYTE [DISTWIDTH] IN BLOOD BY AUTOMATED COUNT: 44.3 FL (ref 35.9–50)
EST. AVERAGE GLUCOSE BLD GHB EST-MCNC: 134 MG/DL
GFR SERPL CREATININE-BSD FRML MDRD: >60 ML/MIN/1.73 M 2
GLOBULIN SER CALC-MCNC: 3.2 G/DL (ref 1.9–3.5)
GLUCOSE BLD-MCNC: 142 MG/DL (ref 65–99)
GLUCOSE BLD-MCNC: 144 MG/DL (ref 65–99)
GLUCOSE BLD-MCNC: 162 MG/DL (ref 65–99)
GLUCOSE SERPL-MCNC: 230 MG/DL (ref 65–99)
GRAM STN SPEC: NORMAL
HBA1C MFR BLD: 6.3 % (ref 0–5.6)
HCO3 BLDA-SCNC: 25 MMOL/L (ref 17–25)
HCT VFR BLD AUTO: 48.1 % (ref 37–47)
HGB BLD-MCNC: 15.4 G/DL (ref 12–16)
LYMPHOCYTES # BLD AUTO: 0.93 K/UL (ref 1–4.8)
LYMPHOCYTES NFR BLD: 11.3 % (ref 22–41)
MACROCYTES BLD QL SMEAR: ABNORMAL
MAGNESIUM SERPL-MCNC: 2.2 MG/DL (ref 1.5–2.5)
MANUAL DIFF BLD: NORMAL
MCH RBC QN AUTO: 29.4 PG (ref 27–33)
MCHC RBC AUTO-ENTMCNC: 32 G/DL (ref 33.6–35)
MCV RBC AUTO: 91.8 FL (ref 81.4–97.8)
MICROCYTES BLD QL SMEAR: ABNORMAL
MONOCYTES # BLD AUTO: 0.57 K/UL (ref 0–0.85)
MONOCYTES NFR BLD AUTO: 6.9 % (ref 0–13.4)
MORPHOLOGY BLD-IMP: NORMAL
MYELOCYTES NFR BLD MANUAL: 0.9 %
NEUTROPHILS # BLD AUTO: 6.63 K/UL (ref 2–7.15)
NEUTROPHILS NFR BLD: 77.4 % (ref 44–72)
NEUTS BAND NFR BLD MANUAL: 3.5 % (ref 0–10)
NRBC # BLD AUTO: 0 K/UL
NRBC BLD AUTO-RTO: 0 /100 WBC
PCO2 BLDA: 51.8 MMHG (ref 26–37)
PH BLDA: 7.3 [PH] (ref 7.4–7.5)
PLATELET # BLD AUTO: 222 K/UL (ref 164–446)
PLATELET BLD QL SMEAR: NORMAL
PMV BLD AUTO: 10.2 FL (ref 9–12.9)
PO2 BLDA: 65.2 MMHG (ref 64–87)
POLYCHROMASIA BLD QL SMEAR: NORMAL
POTASSIUM SERPL-SCNC: 3.9 MMOL/L (ref 3.6–5.5)
PROT SERPL-MCNC: 6.8 G/DL (ref 6–8.2)
RBC # BLD AUTO: 5.24 M/UL (ref 4.2–5.4)
RBC BLD AUTO: PRESENT
SAO2 % BLDA: 90.9 % (ref 93–99)
SIGNIFICANT IND 70042: NORMAL
SITE SITE: NORMAL
SODIUM SERPL-SCNC: 143 MMOL/L (ref 135–145)
SOURCE SOURCE: NORMAL
WBC # BLD AUTO: 8.2 K/UL (ref 4.8–10.8)

## 2017-04-22 PROCEDURE — 700111 HCHG RX REV CODE 636 W/ 250 OVERRIDE (IP): Performed by: HOSPITALIST

## 2017-04-22 PROCEDURE — 84145 PROCALCITONIN (PCT): CPT

## 2017-04-22 PROCEDURE — 82962 GLUCOSE BLOOD TEST: CPT

## 2017-04-22 PROCEDURE — 700105 HCHG RX REV CODE 258: Performed by: STUDENT IN AN ORGANIZED HEALTH CARE EDUCATION/TRAINING PROGRAM

## 2017-04-22 PROCEDURE — 700102 HCHG RX REV CODE 250 W/ 637 OVERRIDE(OP): Performed by: INTERNAL MEDICINE

## 2017-04-22 PROCEDURE — 700111 HCHG RX REV CODE 636 W/ 250 OVERRIDE (IP): Performed by: STUDENT IN AN ORGANIZED HEALTH CARE EDUCATION/TRAINING PROGRAM

## 2017-04-22 PROCEDURE — 36415 COLL VENOUS BLD VENIPUNCTURE: CPT

## 2017-04-22 PROCEDURE — 82803 BLOOD GASES ANY COMBINATION: CPT

## 2017-04-22 PROCEDURE — 94640 AIRWAY INHALATION TREATMENT: CPT

## 2017-04-22 PROCEDURE — 99407 BEHAV CHNG SMOKING > 10 MIN: CPT

## 2017-04-22 PROCEDURE — 83036 HEMOGLOBIN GLYCOSYLATED A1C: CPT

## 2017-04-22 PROCEDURE — 85027 COMPLETE CBC AUTOMATED: CPT

## 2017-04-22 PROCEDURE — 94667 MNPJ CHEST WALL 1ST: CPT

## 2017-04-22 PROCEDURE — 80053 COMPREHEN METABOLIC PANEL: CPT

## 2017-04-22 PROCEDURE — 700102 HCHG RX REV CODE 250 W/ 637 OVERRIDE(OP): Performed by: STUDENT IN AN ORGANIZED HEALTH CARE EDUCATION/TRAINING PROGRAM

## 2017-04-22 PROCEDURE — 85007 BL SMEAR W/DIFF WBC COUNT: CPT

## 2017-04-22 PROCEDURE — 770020 HCHG ROOM/CARE - TELE (206)

## 2017-04-22 PROCEDURE — 99233 SBSQ HOSP IP/OBS HIGH 50: CPT | Mod: GC | Performed by: HOSPITALIST

## 2017-04-22 PROCEDURE — 83735 ASSAY OF MAGNESIUM: CPT

## 2017-04-22 PROCEDURE — 83880 ASSAY OF NATRIURETIC PEPTIDE: CPT

## 2017-04-22 PROCEDURE — A9270 NON-COVERED ITEM OR SERVICE: HCPCS | Performed by: STUDENT IN AN ORGANIZED HEALTH CARE EDUCATION/TRAINING PROGRAM

## 2017-04-22 PROCEDURE — 700101 HCHG RX REV CODE 250: Performed by: STUDENT IN AN ORGANIZED HEALTH CARE EDUCATION/TRAINING PROGRAM

## 2017-04-22 RX ORDER — DEXTROSE MONOHYDRATE 25 G/50ML
25 INJECTION, SOLUTION INTRAVENOUS
Status: DISCONTINUED | OUTPATIENT
Start: 2017-04-22 | End: 2017-04-25 | Stop reason: HOSPADM

## 2017-04-22 RX ADMIN — IPRATROPIUM BROMIDE AND ALBUTEROL SULFATE 3 ML: .5; 3 SOLUTION RESPIRATORY (INHALATION) at 14:36

## 2017-04-22 RX ADMIN — CEFTRIAXONE SODIUM 2 G: 2 INJECTION, POWDER, FOR SOLUTION INTRAMUSCULAR; INTRAVENOUS at 07:25

## 2017-04-22 RX ADMIN — SODIUM CHLORIDE: 9 INJECTION, SOLUTION INTRAVENOUS at 07:07

## 2017-04-22 RX ADMIN — METHYLPREDNISOLONE SODIUM SUCCINATE 62.5 MG: 125 INJECTION, POWDER, FOR SOLUTION INTRAMUSCULAR; INTRAVENOUS at 12:16

## 2017-04-22 RX ADMIN — ACETAMINOPHEN 650 MG: 325 TABLET, FILM COATED ORAL at 20:39

## 2017-04-22 RX ADMIN — STANDARDIZED SENNA CONCENTRATE AND DOCUSATE SODIUM 2 TABLET: 8.6; 5 TABLET, FILM COATED ORAL at 20:39

## 2017-04-22 RX ADMIN — IPRATROPIUM BROMIDE AND ALBUTEROL SULFATE 3 ML: .5; 3 SOLUTION RESPIRATORY (INHALATION) at 10:27

## 2017-04-22 RX ADMIN — ENOXAPARIN SODIUM 40 MG: 100 INJECTION SUBCUTANEOUS at 07:07

## 2017-04-22 RX ADMIN — METHYLPREDNISOLONE SODIUM SUCCINATE 62.5 MG: 125 INJECTION, POWDER, FOR SOLUTION INTRAMUSCULAR; INTRAVENOUS at 05:32

## 2017-04-22 RX ADMIN — INSULIN LISPRO 1 UNITS: 100 INJECTION, SOLUTION INTRAVENOUS; SUBCUTANEOUS at 17:16

## 2017-04-22 RX ADMIN — NICOTINE 21 MG: 21 PATCH, EXTENDED RELEASE TRANSDERMAL at 05:32

## 2017-04-22 RX ADMIN — GUAIFENESIN 200 MG: 100 SOLUTION ORAL at 20:39

## 2017-04-22 RX ADMIN — IPRATROPIUM BROMIDE AND ALBUTEROL SULFATE 3 ML: .5; 3 SOLUTION RESPIRATORY (INHALATION) at 06:59

## 2017-04-22 RX ADMIN — METHYLPREDNISOLONE SODIUM SUCCINATE 62.5 MG: 125 INJECTION, POWDER, FOR SOLUTION INTRAMUSCULAR; INTRAVENOUS at 18:00

## 2017-04-22 RX ADMIN — AZITHROMYCIN MONOHYDRATE 500 MG: 500 INJECTION, POWDER, LYOPHILIZED, FOR SOLUTION INTRAVENOUS at 20:39

## 2017-04-22 ASSESSMENT — PAIN SCALES - GENERAL
PAINLEVEL_OUTOF10: 0

## 2017-04-22 ASSESSMENT — ENCOUNTER SYMPTOMS
PND: 0
COUGH: 1
ORTHOPNEA: 0
NAUSEA: 1
FEVER: 1
SHORTNESS OF BREATH: 1
MYALGIAS: 0
DIZZINESS: 0
SPUTUM PRODUCTION: 1
WHEEZING: 1
NERVOUS/ANXIOUS: 0
PALPITATIONS: 0
CLAUDICATION: 0
CHILLS: 0
VOMITING: 0
HEADACHES: 0
BLURRED VISION: 0
DEPRESSION: 0

## 2017-04-22 ASSESSMENT — LIFESTYLE VARIABLES
EVER_SMOKED: YES
DO YOU DRINK ALCOHOL: NO
PACK_YEARS: 45

## 2017-04-22 NOTE — SENIOR ADMIT NOTE
[facility] INTERNAL MEDICINE SENIOR ADMIT NOTE:      Patient ID:   Name:             Radha Guerin     YOB: 1955  Age:                 61 y.o.  female   MRN:               [id number]                                                          Chief Complaint:       Shortness of breath, productive sputum.     History of Present Illness:    Mr. Guerin is a 61 y.o. female with a PMhx of COPD (40 pack yrs), current smoker presented to ED with complains worsening shortness of breath associated with productive cough, green phlegm for the last 6 days, she started to feel worse for the last 3 days. States occasional chills with no fever, nausea, vomiting. States that she has occasional chest pain with exertion for the last 3 days, subsides with rest.   Does not have PCP. She is contemplating about tobacco cessation.     In ED she received a dose of 60 mg prednisone, IV fluids, nebulizations, doxycycline, ceftriaxone.     Active Ambulatory Problems     Diagnosis Date Noted   • No Active Ambulatory Problems     Resolved Ambulatory Problems     Diagnosis Date Noted   • No Resolved Ambulatory Problems     No Additional Past Medical History         Vitals:   Weight/BMI: Body mass index is 27.73 kg/(m^2).  Blood pressure 187/113, pulse 93, temperature 36 °C (96.8 °F), resp. rate 20, height 1.524 m (5'), weight 64.4 kg (141 lb 15.6 oz), SpO2 92 %, not currently breastfeeding.  Filed Vitals:    04/21/17 2130 04/21/17 2200 04/21/17 2235 04/22/17 0000   BP:       Pulse: 117 113 120 93   Temp:  36.8 °C (98.2 °F)  36 °C (96.8 °F)   Resp:  22 22 20   Height:  1.524 m (5')     Weight:  64.4 kg (141 lb 15.6 oz)     SpO2: 88% 94% 89% 92%     Oxygen Therapy:  Pulse Oximetry: 92 %, O2 (LPM): 6, O2 Delivery: Silicone Nasal Cannula   61 years old female on 15 L via Oxy mask, noted acute distress  CVS is tachycardic, and he didn't appreciate murmur  RS expiratory wheeze all over the lung fields, no rales or rhonchi  ABD soft,  nontender, nondistended  No pedal edema, no focal neurological deficits      Assessment and plan:  Acute on chronic hypoxic respiratory failure   COPD exacerbation   Community acquired pneumonia causing COPD exacerbation  1.8 cm nodule of right upper lobe   Elevated hemoglobin, hematocrit  Hyperglycemia    Patient will be continued on azithromycin, ceftriaxone, IV fluids (normal BNP). IV Solu-Medrol can be changed to oral prednisone this morning. Scheduled RT nebulizations. pro calcitonin pending. Follow cultures.   Needs follow-up of 1.8 cm nodule of the right upper lobe which is stable since 2012.  Tobacco cessation counseling provided. She needs to establish with outpatient provider.      Please refer to [facility] for complete admission details.

## 2017-04-22 NOTE — PROGRESS NOTES
Bedside report received. Assessment completed. Patient alert and oriented x4. Cooperative with all care. Patient steady on her feet, no history of falls. Patient educated on fall risk by thi RN and CNA. Strip alarm off at this time. Patient states she has productive cough, but unable to assess secretions. Lungs diminished t/o. IS education completed. Patient verbalizes understanding and demonstrates IS use to 1200 ML. Patient has no c/o or concerns at this time. Call light within reach. Safety maintained.

## 2017-04-22 NOTE — PROGRESS NOTES
Assessment completed. No acute changes. Call light in reach. Safety maintained.  Patient's daughter at bedside.

## 2017-04-22 NOTE — CARE PLAN
Problem: Oxygenation:  Goal: Maintain adequate oxygenation dependent on patient condition  Outcome: PROGRESSING AS EXPECTED  Intervention: Manage oxygen therapy by monitoring pulse oximetry and/or ABG values  90-94% 6lpm NC      Problem: Bronchoconstriction:  Goal: Improve in air movement and diminished wheezing  Outcome: PROGRESSING AS EXPECTED  Intervention: Implement inhaled treatments  DUO Q4

## 2017-04-22 NOTE — CARE PLAN
Problem: Safety  Goal: Will remain free from injury  Outcome: PROGRESSING AS EXPECTED  Bed locked in low position, call light in reach, treaded socks on.    Problem: Knowledge Deficit  Goal: Knowledge of disease process/condition, treatment plan, diagnostic tests, and medications will improve  Outcome: PROGRESSING AS EXPECTED  POC discussed, pt verbalizes understanding.

## 2017-04-22 NOTE — FLOWSHEET NOTE
04/22/17 1441   Interdisciplinary Plan of Care-Goals (Indications)   Obstructive Ventilatory Defect or Pulmonary Disease without Obvious Obstruction History / Diagnosis   Interdisciplinary Plan of Care-Outcomes    Bronchodilator Outcome Diminished Wheezing and Volume of Air Movement Increased   Education   Education Yes - Pt. / Family has been Instructed in use of Respiratory Equipment   RT Assessment of Delivered Medications   Evaluation of Medication Delivery Daily Yes-- Pt /Family has been Instructed in use of Respiratory Medications and Adverse Reactions   SVN Group   #SVN Performed Yes   Given By: Mouthpiece   Pulmonary Function Group   Peak Flow--Pre (ml / sec)  150   Peak Flow Predicted Values 373   Respiratory WDL   Respiratory (WDL) X   Chest Exam   Work Of Breathing / Effort Mild   Respiration 17   Pulse (!) 116   Breath Sounds   Pre/Post Intervention Pre Intervention Assessment   RUL Breath Sounds Diminished   RML Breath Sounds Diminished   RLL Breath Sounds Diminished   ULICES Breath Sounds Diminished   LLL Breath Sounds Diminished   Secretions   Cough Non Productive;Dry   How Sputum Obtained Spontaneous   Sputum Amount Unable to Evaluate   Oxygen   Pulse Oximetry 93 %   O2 (LPM) 6   O2 Daily Delivery Respiratory  Silicone Nasal Cannula

## 2017-04-22 NOTE — RESPIRATORY CARE
"COPD EDUCATION by COPD CLINICAL EDUCATOR  4/22/2017  at  10:47 AM by Angelica Mittal     Patient interviewed by COPD education team.  Patient unable to participate in full program.  Short intervention has been conducted.  A comprehensive packet including information about COPD, treatments, and smoking cessation given.Provided smoking cessation packet with \"Tips to Quit\" and flyer for \"Free Smoking Cessation Classes\". Provided \"Quit Card\" with the phone number to [facility] for free nicotine replacement therapy. All information was provided in Macedonian at patients' request  "

## 2017-04-22 NOTE — ASSESSMENT & PLAN NOTE
- unchanged (since 2011/2012) 1.8 cm RUL nodule most c/w an old granuloma  - CTM  - Repeat CT in 6 months as per PCP discretion

## 2017-04-22 NOTE — ASSESSMENT & PLAN NOTE
- Blood glucose elevated up to 230  - No history of diabetes the patient is aware of  - A1c 6.3 impaired glucose tolerance  - possibly due to steroid effect  - ISS, hypoglycemia protocol and finger stick check up  - controlled on discharge

## 2017-04-22 NOTE — H&P
"       [facility] Admitting History and Physical    Name Radha Guerin       1955   Age/Sex 61 y.o. female   MRN [id number]   Code Status FULL     After 5PM or if no immediate response to page, please call for cross-coverage  Attending/Team: Nikos/Daina Call [phone] to page   1st Call - Day Intern (R1):   Renato 2nd Call - Day Sr. Resident (R2/R3):   Leyda       Chief Complaint:  SOB, cough    HPI:  This is a 61-year-old female who presents to the emergency room with complaints of shortness of breath and cough. She states that she has been feeling ill for the past 6 days with increased cough and sputum production above her baseline. She states that she has \"lung problems \". These lung problems are likely COPD given her 40-pack-year smoking history. She also says that she has had fevers, dizziness and fatigue for the past 3 days. She is on no medications regularly though she does occasionally use an inhaler (unsure of which) when she finds her breathing is particularly troubled. Her baseline shortness of breath is worsened by activity, she does not wear oxygen at home.    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 She says that she has had this chest discomfort with activity in the past. She denies any history of heart problems including MI.     Denies chills, weight loss, sweating, hemoptysis, wheezing; endorses increased cough, increased sputum production, increased shortness of breath, subjective fevers, generalized myalgias and arthralgias, dizziness.    In the ED she was placed on 15 L oxygen by oxy mask, she states that she feels much better with this. She was also given breathing treatment by respiratory therapy which she states helped. Reports that she has been hospitalized in the past for \"breathing problems \"but not recently, last time was about 3 " years ago. Denies ever needing intubation. Continues to smoke at least a pack per day though says that she quit 5 days ago and intends to continue quitting. Denies alcohol or recreational drug use.      Review of Systems   Constitutional: Positive for fever and malaise/fatigue. Negative for chills, weight loss and diaphoresis.   HENT: Negative for congestion, hearing loss and sore throat.    Eyes: Negative for blurred vision.   Respiratory: Positive for cough, sputum production and shortness of breath. Negative for hemoptysis and wheezing.    Cardiovascular: Positive for chest pain and leg swelling. Negative for palpitations and orthopnea.   Gastrointestinal: Negative for heartburn, nausea, vomiting, abdominal pain, diarrhea, constipation and blood in stool.   Genitourinary: Negative for dysuria and hematuria.   Musculoskeletal: Positive for myalgias and joint pain.   Skin: Negative for rash.   Neurological: Positive for dizziness. Negative for tingling, focal weakness, seizures, loss of consciousness, weakness and headaches.   Psychiatric/Behavioral: Negative for substance abuse.             Past Medical History:   History reviewed. No pertinent past medical history.    Past Surgical History:  History reviewed. No pertinent past surgical history.    Current Outpatient Medications:  Home Medications     Reviewed by Yee Amor (Pharmacy Tech) on 04/21/17 at 2050  Med List Status: Complete    Medication Last Dose Status          Patient Madan Taking any Medications                        Medication Allergy/Sensitivities:  No Known Allergies      Family History:  History reviewed. No pertinent family history.    Social History:  Social History     Social History   • Marital Status: Single     Spouse Name: N/A   • Number of Children: N/A   • Years of Education: N/A     Occupational History   • Not on file.     Social History Main Topics   • Smoking status: Current Every Day Smoker -- 1.00 packs/day   •  Smokeless tobacco: Not on file   • Alcohol Use: No   • Drug Use: No   • Sexual Activity: Not on file     Other Topics Concern   • Not on file     Social History Narrative     Living situation: With family  PCP : None      Physical Exam     Filed Vitals:    04/21/17 1915 04/21/17 2000 04/21/17 2030 04/21/17 2100   BP:       Pulse: 147 138 130 105   Temp:       Resp: 22 22 20 20   Height:       Weight:       SpO2: 92% 91% 92% 91%     Body mass index is 28.32 kg/(m^2).  /113 mmHg  Pulse 105  Temp(Src) 38.3 °C (100.9 °F)  Resp 20  Ht 1.524 m (5')  Wt 65.772 kg (145 lb)  BMI 28.32 kg/m2  SpO2 91%  O2 therapy: Pulse Oximetry: 91 %, O2 (LPM): 15, O2 Delivery: Mask    Physical Exam   Constitutional: She is oriented to person, place, and time.   HENT:   Head: Normocephalic and atraumatic.   Mouth/Throat: Oropharynx is clear and moist. No oropharyngeal exudate.   Eyes: Conjunctivae and EOM are normal. Pupils are equal, round, and reactive to light. No scleral icterus.   Neck: Normal range of motion. Neck supple. No JVD present. No tracheal deviation present. No thyromegaly present.   Cardiovascular: Regular rhythm.  Tachycardia present.    Pulses:       Carotid pulses are 2+ on the right side, and 2+ on the left side.       Radial pulses are 2+ on the right side, and 2+ on the left side.        Dorsalis pedis pulses are 2+ on the right side, and 2+ on the left side.   Pulmonary/Chest: No stridor. Tachypnea noted. No respiratory distress. She has decreased breath sounds. She has wheezes.   Coarse breath sounds throughout with inspiratory and expiratory wheezing and prolonged expiratory phase  No respiratory distress  On 15 L oxy mask  Mild accessory muscle usage, no retractions   Abdominal: Soft. Bowel sounds are normal. She exhibits no distension and no mass. There is no tenderness. There is no guarding.   Musculoskeletal: She exhibits edema and tenderness.   Lymphadenopathy:     She has no cervical adenopathy.    Neurological: She is alert and oriented to person, place, and time. No cranial nerve deficit. She exhibits normal muscle tone. Coordination normal. GCS score is 15.   Skin: Skin is warm and dry. No rash noted. No erythema.   Psychiatric: Memory, affect and judgment normal.     Data Review       Old Records Request:   Completed  Current Records review and summary: Completed    Lab Data Review:  Recent Results (from the past 24 hour(s))   EKG (ER)    Collection Time: 17  5:35 PM   Result Value Ref Range    Report       [facility]    Test Date:  2017  Pt Name:    ANMOL SIDDIQUI              Department: ER  MRN:        [id number]                      Room:       [facility]  Gender:     F                            Technician: [id number]  :        1955                   Requested By:ER TRIAGE PROTOCOL  Order #:    [id number]                    Reading MD:    Measurements  Intervals                                Axis  Rate:       125                          P:          69  CO:         128                          QRS:        12  QRSD:       88                           T:          71  QT:         320  QTc:        462    Interpretive Statements  SINUS TACHYCARDIA  CONSIDER RIGHT ATRIAL ABNORMALITY  BORDERLINE LOW VOLTAGE IN FRONTAL LEADS  CONSIDER LEFT VENTRICULAR HYPERTROPHY  CONSIDER ANTERIOR INFARCT  Compared to ECG 2012 19:47:46  Sinus rhythm no longer present  Myocardial infarct finding still present     BLOOD CULTURE,HOLD    Collection Time: 17  5:54 PM   Result Value Ref Range    Blood Culture Hold Collected    CBC w/ Differential    Collection Time: 17  6:15 PM   Result Value Ref Range    WBC 10.2 4.8 - 10.8 K/uL    RBC 5.77 (H) 4.20 - 5.40 M/uL    Hemoglobin 17.0 (H) 12.0 - 16.0 g/dL    Hematocrit 53.0 (H) 37.0 - 47.0 %    MCV 91.9 81.4 - 97.8 fL    MCH 29.5 27.0 - 33.0 pg    MCHC 32.1 (L) 33.6 - 35.0 g/dL    RDW 43.4 35.9 - 50.0 fL    Platelet  Count 201 164 - 446 K/uL    MPV 10.7 9.0 - 12.9 fL    Nucleated RBC 0.00 /100 WBC    NRBC (Absolute) 0.00 K/uL    Neutrophils-Polys 46.00 44.00 - 72.00 %    Lymphocytes 40.70 22.00 - 41.00 %    Monocytes 7.10 0.00 - 13.40 %    Eosinophils 6.20 0.00 - 6.90 %    Basophils 0.00 0.00 - 1.80 %    Neutrophils (Absolute) 4.69 2.00 - 7.15 K/uL    Lymphs (Absolute) 4.15 1.00 - 4.80 K/uL    Monos (Absolute) 0.72 0.00 - 0.85 K/uL    Eos (Absolute) 0.63 (H) 0.00 - 0.51 K/uL    Baso (Absolute) 0.00 0.00 - 0.12 K/uL    Anisocytosis 1+     Microcytosis 1+    Complete Metabolic Panel (CMP)    Collection Time: 04/21/17  6:15 PM   Result Value Ref Range    Sodium 138 135 - 145 mmol/L    Potassium 3.9 3.6 - 5.5 mmol/L    Chloride 103 96 - 112 mmol/L    Co2 25 20 - 33 mmol/L    Anion Gap 10.0 0.0 - 11.9    Glucose 143 (H) 65 - 99 mg/dL    Bun 17 8 - 22 mg/dL    Creatinine 0.51 0.50 - 1.40 mg/dL    Calcium 8.9 8.5 - 10.5 mg/dL    AST(SGOT) 12 12 - 45 U/L    ALT(SGPT) 16 2 - 50 U/L    Alkaline Phosphatase 50 30 - 99 U/L    Total Bilirubin 0.4 0.1 - 1.5 mg/dL    Albumin 4.1 3.2 - 4.9 g/dL    Total Protein 7.3 6.0 - 8.2 g/dL    Globulin 3.2 1.9 - 3.5 g/dL    A-G Ratio 1.3 g/dL   Btype Natriuretic Peptide    Collection Time: 04/21/17  6:15 PM   Result Value Ref Range    B Natriuretic Peptide 64 0 - 100 pg/mL   Troponin STAT    Collection Time: 04/21/17  6:15 PM   Result Value Ref Range    Troponin I <0.01 0.00 - 0.04 ng/mL   Magnesium    Collection Time: 04/21/17  6:15 PM   Result Value Ref Range    Magnesium 2.2 1.5 - 2.5 mg/dL   LACTIC ACID    Collection Time: 04/21/17  6:15 PM   Result Value Ref Range    Lactic Acid 1.8 0.5 - 2.0 mmol/L   ESTIMATED GFR    Collection Time: 04/21/17  6:15 PM   Result Value Ref Range    GFR If African American >60 >60 mL/min/1.73 m 2    GFR If Non African American >60 >60 mL/min/1.73 m 2   DIFFERENTIAL MANUAL    Collection Time: 04/21/17  6:15 PM   Result Value Ref Range    Manual Diff Status PERFORMED     PERIPHERAL SMEAR REVIEW    Collection Time: 04/21/17  6:15 PM   Result Value Ref Range    Peripheral Smear Review see below    PLATELET ESTIMATE    Collection Time: 04/21/17  6:15 PM   Result Value Ref Range    Plt Estimation Normal    MORPHOLOGY    Collection Time: 04/21/17  6:15 PM   Result Value Ref Range    RBC Morphology Present    ABG - LAB    Collection Time: 04/21/17  8:07 PM   Result Value Ref Range    Ph 7.31 (L) 7.40 - 7.50    Pco2 48.8 (H) 26.0 - 37.0 mmHg    Po2 53.4 (L) 64.0 - 87.0 mmHg    O2 Saturation 83.3 (L) 93.0 - 99.0 %    Hco3 24 17 - 25 mmol/L    Base Excess -3 -4 - 3 mmol/L    Body Temp see below Centigrade       Imaging/Procedures Review:    ndependant Imaging Review: Completed  DX-CHEST-PORTABLE (1 VIEW)   Final Result      1.  Increased perihilar interstitial markings are suggestive of atypical infection/peribronchial thickening.         EKG:   EKG Independant Review: Completed  QTc:462, HR: 125, Sinus tachycardia, no ST/T changes    Records reviewed and summarized in current documentation       Assessment/Plan   # COPD with exacerbation  # Community-acquired pneumonia  - No pulmonary function tests on file to definitively diagnose COPD though high clinical suspicion given extensive smoking history and clinical picture  - Reports increased cough, shortness of breath, sputum production for past 3-6 days; on presentation has fever of 100.9°, heart rate up to 152, respiratory rate up to 22, and required 15 L of supplemental oxygen by oxymask  - Chest x-ray showed increased perihilar interstitial markings indicative of possible atypical infection, no focal infiltrates  - She was given sepsis bolus, azithromycin and doxycycline in the ED  - ABGs drawn in the ED (venous sample drawn in error) showed pH is 7.31, PCO2 of 48.8, PO2 of 53.4, bicarbonate of 24, and O2 saturation of 83.3  - Lactic acid within normal limits at 1.8  - Troponin negative, BNP within normal limits 64; EKG showed sinus  tachycardia and possible right atrial abnormality  PLAN  - We'll continue to treat as COPD exacerbation due to what appears to be an atypical community-acquired pneumonia. Patient currently satting high 80s on 15 L, will have low threshold for BiPAP and transfer to ICU.   - Ceftriaxone, azithromycin  - Continue IV fluids at 125 mL an hour  - Redraw ABGs  - Oxygen therapy to maintain O2 sat 88-92%  - Methylprednisolone IV 62.5 mg every 6 hours  - Ipratropium-albuterol nebulizers every 4 hours scheduled, albuterol inhaler every 4 hours as needed  - Guaifenesin every 4 hours as needed  - Sputum cultures  - Incentive spirometry  - Telemetry monitoring, continuous pulse ox  - PT and OT evals    # Tobacco use  - 40 year tobacco use history smoking one plus packs per day  - States that she quit smoking 5 days ago  PLAN  - Nicotine replacement   - Tobacco cessation education    # Hyperglycemia  - Blood glucose on ED labs was 143  - No history of diabetes the patient is aware of  - Will obtain A1c and check fasting glucose on a.m. labs    Anticipated Hospital stay:  >2 midnights    Quality Measures  EKG reviewed, Labs reviewed, Medications reviewed and Radiology images reviewed  Carbajal catheter: No Carbajal      DVT Prophylaxis: Enoxaparin (Lovenox)    Ulcer prophylaxis: Not indicated  Antibiotics: Treating active infection/contamination beyond 24 hours perioperative coverage

## 2017-04-22 NOTE — ED NOTES
RN at bedside for transport. FLoor notified of pt transfer. Pt on monitor. All belongings with pt.

## 2017-04-22 NOTE — ED PROVIDER NOTES
ED Provider Note    Scribed for Carmen Lutz M.D. by Marjan Gar. 4/21/2017, 5:53 PM.    Primary care provider: Pcp Pt States None  Means of arrival: EMS  History obtained from: Patient   History limited by: None    CHIEF COMPLAINT  Chief Complaint   Patient presents with   • Shortness of Breath   • Cough     x 6 days       HPI  Radha Guerin is a 61 y.o. female who presents to the Emergency Department for difficulty breathing, onset 6 days ago. The patient has been a smoker for 40 years and has chronic shortness of breath. She states it worsened with a cough 6 days ago. The patient states she stopped smoking 5 days ago. She is producing green phlegm with her cough, has a fever and dizziness associated. She denies vomiting or diarrhea. The patient is not on oxygen at home but uses an inhaler when she gets sick. The patient had pneumonia 20 years ago. She does not have a primary care doctor     REVIEW OF SYSTEMS  Pertinent positives include shortness of breath, dizziness, cough with phlegm production, fever. Pertinent negatives include no vomiting or diarrhea. As above, all other systems reviewed and are negative.   See HPI for further details.     PAST MEDICAL HISTORY  History reviewed. No pertinent past medical history.    SURGICAL HISTORY  History reviewed. No pertinent past surgical history.    SOCIAL HISTORY  Social History   Substance Use Topics   • Smoking status: Current Every Day Smoker -- 1.00 packs/day   • Smokeless tobacco: None   • Alcohol Use: No      History   Drug Use No       FAMILY HISTORY  History reviewed. No pertinent family history.    CURRENT MEDICATIONS  No current facility-administered medications on file prior to encounter.     Current Outpatient Prescriptions on File Prior to Encounter   Medication Sig Dispense Refill   • azithromycin (ZITHROMAX) 250 MG TABS Take  by mouth every day. 1 tab by mouth for 3 days 4 Each 0   • nicotine (NICODERM) 21 MG/24HR PT24 Apply 1 Patch to  skin as directed every day. 14 Patch 0   • pyridoxine (VITAMIN B-6) 50 MG TABS Take 1 Tab by mouth every day. For use with INH. Refills via pcp 30 Each 0   • predniSONE (DELTASONE) 20 MG TABS Take 2 Tabs by mouth every day. Take with food 6 Each 0   • albuterol (VENTOLIN OR PROVENTIL) 108 (90 BASE) MCG/ACT AERS Inhale 2 Puffs by mouth every 6 hours as needed for Shortness of Breath. 1 Inhaler 0         ALLERGIES  No Known Allergies    PHYSICAL EXAM  VITAL SIGNS: /113 mmHg  Pulse 129  Resp 20  Ht 1.524 m (5')  Wt 65.772 kg (145 lb)  BMI 28.32 kg/m2  Vitals reviewed.  Consitutional: Well-developed, well-nourished. Moderate respiratory distress.  HENT: Normocephalic, right external ear normal, left external ear normal, oropharynx clear and moist.  Eyes: Conjunctivae normal, extraocular movements normal. Negative for: discharge in right and left eye, icterus.  Neck: Range of motion normal, supple. Negative for cervical adenopathy.  Cardiovascular: Tachycardic, regular rhythm, heart sounds normal, intact distal pulses. Negative for: murmur, rub, gallop.  Pulmonary/Chest Wall: Shallow inspirations with prolonged expirations. Moderate respiratory distress. Diffuse inspiratory rales. Expiratory wheezes. Negative for rhonchi.    Abdominal: Soft, bowel sounds normal. Negative for: distention, tenderness, rebound, guarding.  Musculoskeletal: Normal range of motion. Negative for edema.  Neurological: Alert and oriented x3. No focal deficits.  Skin: Warm, dry. Negative for rash.  Psych: Mood/affect normal, behavior normal, judgment normal.        DIAGNOSTIC STUDIES / PROCEDURES    LABS  Results for orders placed or performed during the hospital encounter of 04/21/17   CBC w/ Differential   Result Value Ref Range    WBC 10.2 4.8 - 10.8 K/uL    RBC 5.77 (H) 4.20 - 5.40 M/uL    Hemoglobin 17.0 (H) 12.0 - 16.0 g/dL    Hematocrit 53.0 (H) 37.0 - 47.0 %    MCV 91.9 81.4 - 97.8 fL    MCH 29.5 27.0 - 33.0 pg    MCHC 32.1 (L)  33.6 - 35.0 g/dL    RDW 43.4 35.9 - 50.0 fL    Platelet Count 201 164 - 446 K/uL    MPV 10.7 9.0 - 12.9 fL    Nucleated RBC 0.00 /100 WBC    NRBC (Absolute) 0.00 K/uL    Neutrophils-Polys 46.00 44.00 - 72.00 %    Lymphocytes 40.70 22.00 - 41.00 %    Monocytes 7.10 0.00 - 13.40 %    Eosinophils 6.20 0.00 - 6.90 %    Basophils 0.00 0.00 - 1.80 %    Neutrophils (Absolute) 4.69 2.00 - 7.15 K/uL    Lymphs (Absolute) 4.15 1.00 - 4.80 K/uL    Monos (Absolute) 0.72 0.00 - 0.85 K/uL    Eos (Absolute) 0.63 (H) 0.00 - 0.51 K/uL    Baso (Absolute) 0.00 0.00 - 0.12 K/uL    Anisocytosis 1+     Microcytosis 1+    Complete Metabolic Panel (CMP)   Result Value Ref Range    Sodium 138 135 - 145 mmol/L    Potassium 3.9 3.6 - 5.5 mmol/L    Chloride 103 96 - 112 mmol/L    Co2 25 20 - 33 mmol/L    Anion Gap 10.0 0.0 - 11.9    Glucose 143 (H) 65 - 99 mg/dL    Bun 17 8 - 22 mg/dL    Creatinine 0.51 0.50 - 1.40 mg/dL    Calcium 8.9 8.5 - 10.5 mg/dL    AST(SGOT) 12 12 - 45 U/L    ALT(SGPT) 16 2 - 50 U/L    Alkaline Phosphatase 50 30 - 99 U/L    Total Bilirubin 0.4 0.1 - 1.5 mg/dL    Albumin 4.1 3.2 - 4.9 g/dL    Total Protein 7.3 6.0 - 8.2 g/dL    Globulin 3.2 1.9 - 3.5 g/dL    A-G Ratio 1.3 g/dL   Btype Natriuretic Peptide   Result Value Ref Range    B Natriuretic Peptide 64 0 - 100 pg/mL   Troponin STAT   Result Value Ref Range    Troponin I <0.01 0.00 - 0.04 ng/mL   Magnesium   Result Value Ref Range    Magnesium 2.2 1.5 - 2.5 mg/dL   LACTIC ACID   Result Value Ref Range    Lactic Acid 1.8 0.5 - 2.0 mmol/L   ESTIMATED GFR   Result Value Ref Range    GFR If African American >60 >60 mL/min/1.73 m 2    GFR If Non African American >60 >60 mL/min/1.73 m 2   DIFFERENTIAL MANUAL   Result Value Ref Range    Manual Diff Status PERFORMED    PERIPHERAL SMEAR REVIEW   Result Value Ref Range    Peripheral Smear Review see below    PLATELET ESTIMATE   Result Value Ref Range    Plt Estimation Normal    MORPHOLOGY   Result Value Ref Range    RBC  Morphology Present    BLOOD CULTURE,HOLD   Result Value Ref Range    Blood Culture Hold Collected    ABG - LAB   Result Value Ref Range    Ph 7.31 (L) 7.40 - 7.50    Pco2 48.8 (H) 26.0 - 37.0 mmHg    Po2 53.4 (L) 64.0 - 87.0 mmHg    O2 Saturation 83.3 (L) 93.0 - 99.0 %    Hco3 24 17 - 25 mmol/L    Base Excess -3 -4 - 3 mmol/L    Body Temp see below Centigrade   EKG (ER)   Result Value Ref Range    Report       [facility].    Test Date:  2017  Pt Name:    ANMOL SIDDIQUI              Department: ER  MRN:        [id number]                      Room:        14  Gender:     F                            Technician: [id number]  :        1955                   Requested By:ER TRIAGE PROTOCOL  Order #:    [id number]                    Reading MD: AUBRIE HERMAN MD    Measurements  Intervals                                Axis  Rate:       125                          P:          69  FL:         128                          QRS:        12  QRSD:       88                           T:          71  QT:         320  QTc:        462    Interpretive Statements  SINUS TACHYCARDIA at 125  CONSIDER RIGHT ATRIAL ABNORMALITY  BORDERLINE LOW VOLTAGE IN FRONTAL LEADS  CONSIDER LEFT VENTRICULAR HYPERTROPHY  poor R-wave progression  No ST or T-wave changes to indicate ischemia or infarct  Compared to ECG 2012 19:47:46  Sinus rhythm no longer present  Myocardial infa rct finding still present    Electronically Signed On 2017 21:16:20 PDT by AUBRIE HERMAN MD         All labs reviewed by me.    EKG Interpretation:  12-lead EKG by my interpretation shows:SINUS TACHYCARDIA at 125  CONSIDER RIGHT ATRIAL ABNORMALITY  BORDERLINE LOW VOLTAGE IN FRONTAL LEADS  CONSIDER LEFT VENTRICULAR HYPERTROPHY  poor R-wave progression  No ST or T-wave changes to indicate ischemia or infarct  Compared to ECG 2012 19:47:46  Sinus rhythm no longer present  Myocardial infarct finding still  present    RADIOLOGY  DX-CHEST-PORTABLE (1 VIEW)   Final Result      1.  Increased perihilar interstitial markings are suggestive of atypical infection/peribronchial thickening.        The radiologist's interpretation of all radiological studies have been reviewed by me.    COURSE & MEDICAL DECISION MAKING  Nursing notes, VS [id number] reviewed in chart.    Obtained and reviewed past medical records from 2012 where the patient was admitted for shortness of breath and coughing at that time.    5:53 PM Patient seen and examined at bedside. The patient presents with shortness of breath and productive cough and the differential diagnosis includes but is not limited to COPD exacerbation, bronchitis, pneumonia, CHF, SIRS/sepsis. Ordered DX-chest, Lactic acid, CBC, CMP, BNP, Troponin STAT, Magnesium, EKG. Patient will be treated with 2.5mg/ 0.5ml Proventil, 0.02% Proventil, 60mg Deltasone, 100mg in IV fluids for her symptoms.      6:14 PM Paged [facility].     7:48 PM - I discussed the patient's case and the above findings with Dr. Cason ([facility]) who agrees to admit the patient.         DISPOSITION:  Patient will be admitted to Dr. Cason ([facility]) in guarded condition.      FINAL IMPRESSION  1. Acute exacerbation of chronic obstructive pulmonary disease (COPD) (CMS-HCC)    2. Hypoxia    3. Tobacco dependence    4. Bronchitis          Marjan NELSON (Scribe), am scribing for, and in the presence of, Carmen Lutz M.D..    Electronically signed by: Marjan Gar (Scribe), 4/21/2017    Carmen NELSON M.D. personally performed the services described in this documentation, as scribed by Marjan Gar in my presence, and it is both accurate and complete.    The note accurately reflects work and decisions made by me.  Carmen Lutz  4/21/2017  9:16 PM

## 2017-04-22 NOTE — PROGRESS NOTES
[facility] [place] Internal Medicine Interval Note    Name Radha Guerin       1955   Age/Sex 61 y.o. female   MRN [id number]   Code Status Full code     After 5PM or if no immediate response to page, please call for cross-coverage  Attending/Team: Dr. Knott Call [phone] to page   1st Call - Day Intern (R1):   Renato 2nd Call - Day Sr. Resident (R2/R3):   Leyda         Chief complaint/ reason for interval visit (Primary Diagnosis)   COPD exacerbation     Interval Problem Daily Status Update   Currently satting at 90% on 7 L. Reports cough, greenish phlegm production, fevers without chills. Denies any dizziness, loss of consciousness, or lightheadedness. Currently stable vitals. Continue RT, DuoNeb, ceftriaxone, Zithromax (normal QTC), Solu-Medrol 60.5 every 6 hrs, pre-nebulization and post nebulization peak flow meter. Titrate oxygen to 88-91 percent.     Active Problems:    Acute respiratory failure with hypoxia and hypercarbia (CMS-HCC) POA: Yes    COPD with exacerbation (CMS-HCC) POA: Yes    Pneumonia POA: Yes    Acute respiratory acidosis POA: Yes    Hyperglycemia POA: Unknown    Tobacco abuse POA: Unknown    Lung nodule POA: Yes  Resolved Problems:    * No resolved hospital problems. *      Review of Systems   Constitutional: Positive for fever. Negative for chills and malaise/fatigue.   Eyes: Negative for blurred vision.   Respiratory: Positive for cough, sputum production, shortness of breath and wheezing.    Cardiovascular: Positive for chest pain. Negative for palpitations, orthopnea, claudication and PND.   Gastrointestinal: Positive for nausea. Negative for vomiting.   Genitourinary: Negative for dysuria.   Musculoskeletal: Negative for myalgias.   Skin: Positive for rash.   Neurological: Negative for dizziness and headaches.   Psychiatric/Behavioral: Negative for depression. The patient is not nervous/anxious.        Consultants/Specialty  None    Disposition  Admit for  COPD exacerbation    Quality Measures    Carbajal catheter: No Carbajal      DVT Prophylaxis: Enoxaparin (Lovenox)    Ulcer prophylaxis: Not indicated              Physical Exam       Filed Vitals:    04/22/17 0701 04/22/17 0806 04/22/17 1027 04/22/17 1115   BP:  146/88  139/82   Pulse: 86 105 91 104   Temp:  37.1 °C (98.7 °F)  36.9 °C (98.5 °F)   Resp: 20 17 18 16   Height:       Weight:       SpO2: 93% 91% 92% 90%     Body mass index is 27.73 kg/(m^2). Weight: 64.4 kg (141 lb 15.6 oz)  Oxygen Therapy:  Pulse Oximetry: 90 %, O2 (LPM): 6, O2 Delivery: Silicone Nasal Cannula    Physical Exam   Constitutional: She is oriented to person, place, and time and well-developed, well-nourished, and in no distress. No distress.   HENT:   Head: Normocephalic.   Eyes: EOM are normal.   Neck: Neck supple.   Cardiovascular: Normal rate and regular rhythm.  Exam reveals no gallop and no friction rub.    No murmur heard.  Pulmonary/Chest: No respiratory distress. She has wheezes. She has no rales.   Abdominal: Soft.   Musculoskeletal: Normal range of motion. She exhibits no edema.   Neurological: She is alert and oriented to person, place, and time. GCS score is 15.   Skin: She is not diaphoretic.   Psychiatric: Mood and affect normal.         Lab Data Review:      4/22/2017  2:34 PM    Recent Labs      04/21/17 1815 04/22/17   0144   SODIUM  138  143   POTASSIUM  3.9  3.9   CHLORIDE  103  107   CO2  25  26   BUN  17  10   CREATININE  0.51  0.44*   MAGNESIUM  2.2  2.2   CALCIUM  8.9  8.5       Recent Labs      04/21/17 1815 04/22/17   0144   ALTSGPT  16  17   ASTSGOT  12  12   ALKPHOSPHAT  50  44   TBILIRUBIN  0.4  0.3   GLUCOSE  143*  230*       Recent Labs      04/21/17 1815 04/22/17   0144   RBC  5.77*  5.24   HEMOGLOBIN  17.0*  15.4   HEMATOCRIT  53.0*  48.1*   PLATELETCT  201  222       Recent Labs      04/21/17 1815 04/22/17   0144   WBC  10.2  8.2   NEUTSPOLYS  46.00  77.40*   LYMPHOCYTES  40.70  11.30*   MONOCYTES   7.10  6.90   EOSINOPHILS  6.20  0.00   BASOPHILS  0.00  0.00   ASTSGOT  12  12   ALTSGPT  16  17   ALKPHOSPHAT  50  44   TBILIRUBIN  0.4  0.3           Assessment/Plan     COPD with exacerbation (CMS-[facility])  Assessment & Plan  # Community-acquired pneumonia  - No pulmonary function tests on file to definitively diagnose COPD though high clinical suspicion given extensive smoking history and clinical picture  - Reports increased cough, shortness of breath, sputum production for past 3-6 days; on presentation has fever of 100.9°, heart rate up to 152, respiratory rate up to 22, and required 15 L of supplemental oxygen by oxymask  - Chest x-ray showed increased perihilar interstitial markings indicative of possible atypical infection, no focal infiltrates  - She was given sepsis bolus, azithromycin and doxycycline in the ED  - AB.3/51/65/25  - Lactic acid within normal limits at 1.8  - Troponin negative, BNP within normal limits 64; EKG showed sinus tachycardia and possible right atrial abnormality    - Ceftriaxone, azithromycin  - Continue IV fluids at 125 mL an hour  - Oxygen therapy to maintain O2 sat 88-92%  - Methylprednisolone IV 62.5 mg every 6 hours  - Ipratropium-albuterol nebulizers every 4 hours scheduled, albuterol inhaler every 2 hours as needed. Pre and Post Neb peak flows.  - Guaifenesin every 4 hours as needed  - Sputum cultures pending  - Incentive spirometry  - Telemetry monitoring, continuous pulse ox  - PT and OT evals      Pneumonia  Assessment & Plan  - as listed below for COPD exacerbation     Hyperglycemia  Assessment & Plan  - Blood glucose on ED labs was 143  - No history of diabetes the patient is aware of  - A1c pending      Tobacco abuse  Assessment & Plan  - 40 year tobacco use history smoking one plus packs per day  - States that she quit smoking 5 days ago  - Nicotine replacement    - Tobacco cessation education      Lung nodule  Assessment & Plan  - unchanged (since ) 1.8 cm  RUL nodule most c/w an old granuloma  - CTM  - Repeat CT in 6 months as per PCP discretion

## 2017-04-22 NOTE — ASSESSMENT & PLAN NOTE
- 40 year tobacco use history smoking one plus packs per day  - States that she quit smoking 5 days ago  - Nicotine replacement    - Tobacco cessation education

## 2017-04-22 NOTE — ED NOTES
62 y/o female BIB [facility]  Chief Complaint   Patient presents with   • Shortness of Breath   • Cough     x 6 days     Pt has hx of PNA  PTA /108, heart rate 114, 2 duo nebs, 1 albuterol

## 2017-04-22 NOTE — PROGRESS NOTES
Pt admitted from ER via cart.  Assumed pt care, assessment complete.  Oriented to room/controls, needs met at this time, bed alarm armed, belongings and call light in reach treaded socks on, bed in low position.   2 RN skin check w/ no deficits.

## 2017-04-22 NOTE — PROGRESS NOTES
12 Hr chart check.  Telemetry thru the noc .12/.08/.34 ST .  Minimal sputum production, breathing txs as ordered, denies pain.  ivf continues.  Family at bedside, supportive.  Continuous SaO2 92, good urine output

## 2017-04-23 LAB
ALBUMIN SERPL BCP-MCNC: 3.4 G/DL (ref 3.2–4.9)
ALBUMIN/GLOB SERPL: 1.2 G/DL
ALP SERPL-CCNC: 41 U/L (ref 30–99)
ALT SERPL-CCNC: 18 U/L (ref 2–50)
ANION GAP SERPL CALC-SCNC: 8 MMOL/L (ref 0–11.9)
AST SERPL-CCNC: 15 U/L (ref 12–45)
BILIRUB SERPL-MCNC: 0.2 MG/DL (ref 0.1–1.5)
BUN SERPL-MCNC: 15 MG/DL (ref 8–22)
CALCIUM SERPL-MCNC: 9.1 MG/DL (ref 8.5–10.5)
CHLORIDE SERPL-SCNC: 105 MMOL/L (ref 96–112)
CO2 SERPL-SCNC: 25 MMOL/L (ref 20–33)
CREAT SERPL-MCNC: 0.42 MG/DL (ref 0.5–1.4)
ERYTHROCYTE [DISTWIDTH] IN BLOOD BY AUTOMATED COUNT: 43.3 FL (ref 35.9–50)
GFR SERPL CREATININE-BSD FRML MDRD: >60 ML/MIN/1.73 M 2
GLOBULIN SER CALC-MCNC: 2.8 G/DL (ref 1.9–3.5)
GLUCOSE BLD-MCNC: 137 MG/DL (ref 65–99)
GLUCOSE BLD-MCNC: 143 MG/DL (ref 65–99)
GLUCOSE BLD-MCNC: 162 MG/DL (ref 65–99)
GLUCOSE BLD-MCNC: 197 MG/DL (ref 65–99)
GLUCOSE SERPL-MCNC: 157 MG/DL (ref 65–99)
HCT VFR BLD AUTO: 44.9 % (ref 37–47)
HGB BLD-MCNC: 14.7 G/DL (ref 12–16)
MCH RBC QN AUTO: 29.8 PG (ref 27–33)
MCHC RBC AUTO-ENTMCNC: 32.7 G/DL (ref 33.6–35)
MCV RBC AUTO: 90.9 FL (ref 81.4–97.8)
PLATELET # BLD AUTO: 240 K/UL (ref 164–446)
PMV BLD AUTO: 10.4 FL (ref 9–12.9)
POTASSIUM SERPL-SCNC: 4.5 MMOL/L (ref 3.6–5.5)
PROT SERPL-MCNC: 6.2 G/DL (ref 6–8.2)
RBC # BLD AUTO: 4.94 M/UL (ref 4.2–5.4)
SODIUM SERPL-SCNC: 138 MMOL/L (ref 135–145)
WBC # BLD AUTO: 13.5 K/UL (ref 4.8–10.8)

## 2017-04-23 PROCEDURE — 80053 COMPREHEN METABOLIC PANEL: CPT

## 2017-04-23 PROCEDURE — 700101 HCHG RX REV CODE 250: Performed by: STUDENT IN AN ORGANIZED HEALTH CARE EDUCATION/TRAINING PROGRAM

## 2017-04-23 PROCEDURE — 99232 SBSQ HOSP IP/OBS MODERATE 35: CPT | Mod: GC | Performed by: HOSPITALIST

## 2017-04-23 PROCEDURE — 36415 COLL VENOUS BLD VENIPUNCTURE: CPT

## 2017-04-23 PROCEDURE — 94668 MNPJ CHEST WALL SBSQ: CPT

## 2017-04-23 PROCEDURE — 700101 HCHG RX REV CODE 250: Performed by: INTERNAL MEDICINE

## 2017-04-23 PROCEDURE — 700105 HCHG RX REV CODE 258: Performed by: HOSPITALIST

## 2017-04-23 PROCEDURE — 700111 HCHG RX REV CODE 636 W/ 250 OVERRIDE (IP): Performed by: STUDENT IN AN ORGANIZED HEALTH CARE EDUCATION/TRAINING PROGRAM

## 2017-04-23 PROCEDURE — 82962 GLUCOSE BLOOD TEST: CPT | Mod: 91

## 2017-04-23 PROCEDURE — 85027 COMPLETE CBC AUTOMATED: CPT

## 2017-04-23 PROCEDURE — 770020 HCHG ROOM/CARE - TELE (206)

## 2017-04-23 PROCEDURE — 94640 AIRWAY INHALATION TREATMENT: CPT

## 2017-04-23 PROCEDURE — A9270 NON-COVERED ITEM OR SERVICE: HCPCS | Performed by: STUDENT IN AN ORGANIZED HEALTH CARE EDUCATION/TRAINING PROGRAM

## 2017-04-23 PROCEDURE — 700102 HCHG RX REV CODE 250 W/ 637 OVERRIDE(OP): Performed by: STUDENT IN AN ORGANIZED HEALTH CARE EDUCATION/TRAINING PROGRAM

## 2017-04-23 PROCEDURE — 700111 HCHG RX REV CODE 636 W/ 250 OVERRIDE (IP): Performed by: HOSPITALIST

## 2017-04-23 RX ORDER — HYDROCODONE BITARTRATE AND ACETAMINOPHEN 5; 325 MG/1; MG/1
1 TABLET ORAL EVERY 6 HOURS PRN
Status: DISCONTINUED | OUTPATIENT
Start: 2017-04-23 | End: 2017-04-25 | Stop reason: HOSPADM

## 2017-04-23 RX ORDER — IPRATROPIUM BROMIDE AND ALBUTEROL SULFATE 2.5; .5 MG/3ML; MG/3ML
3 SOLUTION RESPIRATORY (INHALATION)
Status: DISCONTINUED | OUTPATIENT
Start: 2017-04-23 | End: 2017-04-24

## 2017-04-23 RX ADMIN — ALBUTEROL SULFATE 2 PUFF: 90 AEROSOL, METERED RESPIRATORY (INHALATION) at 01:11

## 2017-04-23 RX ADMIN — IPRATROPIUM BROMIDE AND ALBUTEROL SULFATE 3 ML: .5; 3 SOLUTION RESPIRATORY (INHALATION) at 13:51

## 2017-04-23 RX ADMIN — LABETALOL HYDROCHLORIDE 10 MG: 5 INJECTION INTRAVENOUS at 23:47

## 2017-04-23 RX ADMIN — METHYLPREDNISOLONE SODIUM SUCCINATE 62.5 MG: 125 INJECTION, POWDER, FOR SOLUTION INTRAMUSCULAR; INTRAVENOUS at 16:36

## 2017-04-23 RX ADMIN — METHYLPREDNISOLONE SODIUM SUCCINATE 62.5 MG: 125 INJECTION, POWDER, FOR SOLUTION INTRAMUSCULAR; INTRAVENOUS at 23:47

## 2017-04-23 RX ADMIN — NICOTINE 21 MG: 21 PATCH, EXTENDED RELEASE TRANSDERMAL at 06:00

## 2017-04-23 RX ADMIN — METHYLPREDNISOLONE SODIUM SUCCINATE 62.5 MG: 125 INJECTION, POWDER, FOR SOLUTION INTRAMUSCULAR; INTRAVENOUS at 11:47

## 2017-04-23 RX ADMIN — INSULIN LISPRO 1 UNITS: 100 INJECTION, SOLUTION INTRAVENOUS; SUBCUTANEOUS at 00:41

## 2017-04-23 RX ADMIN — ENOXAPARIN SODIUM 40 MG: 100 INJECTION SUBCUTANEOUS at 08:17

## 2017-04-23 RX ADMIN — METHYLPREDNISOLONE SODIUM SUCCINATE 62.5 MG: 125 INJECTION, POWDER, FOR SOLUTION INTRAMUSCULAR; INTRAVENOUS at 06:00

## 2017-04-23 RX ADMIN — CEFTRIAXONE SODIUM 1 G: 1 INJECTION, POWDER, FOR SOLUTION INTRAMUSCULAR; INTRAVENOUS at 08:17

## 2017-04-23 RX ADMIN — INSULIN LISPRO 1 UNITS: 100 INJECTION, SOLUTION INTRAVENOUS; SUBCUTANEOUS at 23:51

## 2017-04-23 RX ADMIN — METHYLPREDNISOLONE SODIUM SUCCINATE 62.5 MG: 125 INJECTION, POWDER, FOR SOLUTION INTRAMUSCULAR; INTRAVENOUS at 00:36

## 2017-04-23 RX ADMIN — INSULIN LISPRO 1 UNITS: 100 INJECTION, SOLUTION INTRAVENOUS; SUBCUTANEOUS at 16:36

## 2017-04-23 RX ADMIN — AZITHROMYCIN MONOHYDRATE 500 MG: 500 INJECTION, POWDER, LYOPHILIZED, FOR SOLUTION INTRAVENOUS at 20:55

## 2017-04-23 RX ADMIN — IPRATROPIUM BROMIDE AND ALBUTEROL SULFATE 3 ML: .5; 3 SOLUTION RESPIRATORY (INHALATION) at 19:05

## 2017-04-23 RX ADMIN — OXYCODONE HYDROCHLORIDE 5 MG: 5 TABLET ORAL at 00:36

## 2017-04-23 ASSESSMENT — ENCOUNTER SYMPTOMS
NERVOUS/ANXIOUS: 0
VOMITING: 0
PND: 0
CLAUDICATION: 0
WHEEZING: 1
SHORTNESS OF BREATH: 1
DIZZINESS: 0
HEADACHES: 0
SPUTUM PRODUCTION: 1
DEPRESSION: 0
CHILLS: 0
ORTHOPNEA: 0
PALPITATIONS: 0
MYALGIAS: 0
NAUSEA: 1
COUGH: 1
BLURRED VISION: 0
FEVER: 1

## 2017-04-23 ASSESSMENT — PATIENT HEALTH QUESTIONNAIRE - PHQ9
2. FEELING DOWN, DEPRESSED, IRRITABLE, OR HOPELESS: NOT AT ALL
SUM OF ALL RESPONSES TO PHQ QUESTIONS 1-9: 0
SUM OF ALL RESPONSES TO PHQ9 QUESTIONS 1 AND 2: 0
1. LITTLE INTEREST OR PLEASURE IN DOING THINGS: NOT AT ALL

## 2017-04-23 ASSESSMENT — PAIN SCALES - GENERAL
PAINLEVEL_OUTOF10: 3
PAINLEVEL_OUTOF10: 0

## 2017-04-23 NOTE — PROGRESS NOTES
Received bedside report from Idania RALPH .  Pt is a/o x 4.  POC discussed w/ pt, verbalizes understanding,  Bed is locked, low, belongings in reach, call light in reach.

## 2017-04-23 NOTE — PROGRESS NOTES
[facility] [place] [facility] Medicine Interval Note    Name Radha Guerin       1955   Age/Sex 61 y.o. female   MRN [id number]   Code Status Full code     After 5PM or if no immediate response to page, please call for cross-coverage  Attending/Team: Dr. Knott Call [phone] to page   1st Call - Day Intern (R1):   Renato 2nd Call - Day Sr. Resident (R2/R3):   Leyda         Chief complaint/ reason for interval visit (Primary Diagnosis)   COPD exacerbation     Interval Problem Daily Status Update   Currently satting at 90% on 3L. Reports improvement in cough. Wheezing improved from yesterday. Tele vitals stable. DC tele today.      Active Problems:    Acute respiratory failure with hypoxia and hypercarbia (CMS-HCC) POA: Yes    COPD with exacerbation (CMS-HCC) POA: Yes    Pneumonia POA: Yes    Acute respiratory acidosis POA: Yes    Hyperglycemia POA: Unknown    Tobacco abuse POA: Unknown    Lung nodule POA: Yes  Resolved Problems:    * No resolved hospital problems. *      Review of Systems   Constitutional: Positive for fever. Negative for chills and malaise/fatigue.   Eyes: Negative for blurred vision.   Respiratory: Positive for cough, sputum production, shortness of breath and wheezing.    Cardiovascular: Positive for chest pain. Negative for palpitations, orthopnea, claudication and PND.   Gastrointestinal: Positive for nausea. Negative for vomiting.   Genitourinary: Negative for dysuria.   Musculoskeletal: Negative for myalgias.   Skin: Positive for rash.   Neurological: Negative for dizziness and headaches.   Psychiatric/Behavioral: Negative for depression. The patient is not nervous/anxious.        Consultants/Specialty  None    Disposition  Admit for COPD exacerbation    Quality Measures    Carbajal catheter: No Carbajal      DVT Prophylaxis: Enoxaparin (Lovenox)    Ulcer prophylaxis: Not indicated              Physical Exam       Filed Vitals:    17 0818 17 0938 17  1157 04/23/17 1250   BP: 148/92  145/88    Pulse: 87 79 74 76   Temp: 36.2 °C (97.1 °F)  36.6 °C (97.8 °F)    Resp: 17 17 18 18   Height:       Weight:       SpO2: 88% 92% 94% 95%     Body mass index is 28.55 kg/(m^2). Weight: 66.3 kg (146 lb 2.6 oz)  Oxygen Therapy:  Pulse Oximetry: 95 %, O2 (LPM): 2.5, O2 Delivery: Silicone Nasal Cannula    Physical Exam   Constitutional: She is oriented to person, place, and time and well-developed, well-nourished, and in no distress. No distress.   HENT:   Head: Normocephalic.   Eyes: EOM are normal.   Neck: Neck supple.   Cardiovascular: Normal rate and regular rhythm.  Exam reveals no gallop and no friction rub.    No murmur heard.  Pulmonary/Chest: No respiratory distress. She has wheezes. She has no rales.   Abdominal: Soft.   Musculoskeletal: Normal range of motion. She exhibits no edema.   Neurological: She is alert and oriented to person, place, and time. GCS score is 15.   Skin: She is not diaphoretic.   Psychiatric: Mood and affect normal.         Lab Data Review:      4/22/2017  2:34 PM    Recent Labs      04/21/17 1815 04/22/17 0144 04/23/17   0308   SODIUM  138  143  138   POTASSIUM  3.9  3.9  4.5   CHLORIDE  103  107  105   CO2  25  26  25   BUN  17  10  15   CREATININE  0.51  0.44*  0.42*   MAGNESIUM  2.2  2.2   --    CALCIUM  8.9  8.5  9.1       Recent Labs      04/21/17 1815 04/22/17 0144  04/23/17   0308   ALTSGPT  16  17  18   ASTSGOT  12  12  15   ALKPHOSPHAT  50  44  41   TBILIRUBIN  0.4  0.3  0.2   GLUCOSE  143*  230*  157*       Recent Labs      04/21/17 1815 04/22/17 0144  04/23/17   0308   RBC  5.77*  5.24  4.94   HEMOGLOBIN  17.0*  15.4  14.7   HEMATOCRIT  53.0*  48.1*  44.9   PLATELETCT  201  222  240       Recent Labs      04/21/17 1815 04/22/17 0144  04/23/17   0308   WBC  10.2  8.2  13.5*   NEUTSPOLYS  46.00  77.40*   --    LYMPHOCYTES  40.70  11.30*   --    MONOCYTES  7.10  6.90   --    EOSINOPHILS  6.20  0.00   --    BASOPHILS   0.00  0.00   --    ASTSGOT  12  12  15   ALTSGPT  16  17  18   ALKPHOSPHAT  50  44  41   TBILIRUBIN  0.4  0.3  0.2           Assessment/Plan     COPD with exacerbation (CMS-[facility])  Assessment & Plan  # Community-acquired pneumonia  - No pulmonary function tests on file to definitively diagnose COPD though high clinical suspicion given extensive smoking history and clinical picture  - Reports increased cough, shortness of breath, sputum production for past 3-6 days; on presentation has fever of 100.9°, heart rate up to 152, respiratory rate up to 22, and required 15 L of supplemental oxygen by oxymask  - Chest x-ray showed increased perihilar interstitial markings indicative of possible atypical infection, no focal infiltrates  - She was given sepsis bolus, azithromycin and doxycycline in the ED  - AB.3/51/65/25  - Lactic acid within normal limits at 1.8  - Troponin negative, BNP within normal limits 64; EKG showed sinus tachycardia and possible right atrial abnormality    - Ceftriaxone, azithromycin  - Continue IV fluids at 125 mL an hour  - Oxygen therapy to maintain O2 sat 88-92%  - Methylprednisolone IV 62.5 mg every 6 hours  - Ipratropium-albuterol nebulizers every 4 hours scheduled, albuterol inhaler every 2 hours as needed. Pre and Post Neb peak flows.  - Guaifenesin every 4 hours as needed  - Sputum cultures pending  - Incentive spirometry  - Telemetry monitoring, continuous pulse ox  - PT and OT evals      Pneumonia  Assessment & Plan  - as listed below for COPD exacerbation     Hyperglycemia  Assessment & Plan  - Blood glucose on ED labs was 143  - No history of diabetes the patient is aware of  - A1c pending      Tobacco abuse  Assessment & Plan  - 40 year tobacco use history smoking one plus packs per day  - States that she quit smoking 5 days ago  - Nicotine replacement    - Tobacco cessation education      Lung nodule  Assessment & Plan  - unchanged (since ) 1.8 cm RUL nodule most c/w an old  granuloma  - CTM  - Repeat CT in 6 months as per PCP discretion

## 2017-04-23 NOTE — PROGRESS NOTES
12 Hr chart check.  Telemetry thru the noc .14/.08/.34 SR 72-84.  Pt currently on 3 lpm n/c w/ 91 % sao2, feeling much better, some rhonchi noted bilateral, spontaneous cough w/ clear sputum, has slept minimally.

## 2017-04-23 NOTE — CARE PLAN
Problem: Nutritional:  Goal: Patient to verbalize or demonstrate understanding of diet  Outcome: MET Date Met:  04/23/17  Provided written and verbal diabetic diet education to patient. The patient is currently on a regular diet; recommend changing to diabetic to reflect education provided.

## 2017-04-23 NOTE — PROGRESS NOTES
Patient care assumed. Bedside report done. Patient alert and oriented x4. No s/s of distress. O2 sat 88-90% on 1lo2 via NC. Patient has productive cough, expectorating thick clear secretions. Patient has no c/o or concerns at this time. Call light in reach. Safety maintained. Family at bedside. See RN flow sheet for full assessment.

## 2017-04-23 NOTE — PROGRESS NOTES
Assessment completed. No acute changes. O2 sat WNL on 1lo2 via NC. Call light in reach. Safety maintained.

## 2017-04-23 NOTE — CARE PLAN
Problem: Safety  Goal: Will remain free from injury  Outcome: PROGRESSING AS EXPECTED  Bed locked in low position, call light in reach, treaded socks on.  Understands to call for assistance.        Problem: Knowledge Deficit  Goal: Knowledge of disease process/condition, treatment plan, diagnostic tests, and medications will improve  Outcome: PROGRESSING AS EXPECTED  POC discussed, pt verbalizes understanding.

## 2017-04-24 PROBLEM — I10 HIGH BLOOD PRESSURE: Status: ACTIVE | Noted: 2017-04-24

## 2017-04-24 LAB
ANISOCYTOSIS BLD QL SMEAR: ABNORMAL
BACTERIA SPEC RESP CULT: NORMAL
BASOPHILS # BLD AUTO: 0 % (ref 0–1.8)
BASOPHILS # BLD: 0 K/UL (ref 0–0.12)
EOSINOPHIL # BLD AUTO: 0 K/UL (ref 0–0.51)
EOSINOPHIL NFR BLD: 0 % (ref 0–6.9)
ERYTHROCYTE [DISTWIDTH] IN BLOOD BY AUTOMATED COUNT: 43.9 FL (ref 35.9–50)
GLUCOSE BLD-MCNC: 117 MG/DL (ref 65–99)
GLUCOSE BLD-MCNC: 127 MG/DL (ref 65–99)
GLUCOSE BLD-MCNC: 127 MG/DL (ref 65–99)
GLUCOSE BLD-MCNC: 167 MG/DL (ref 65–99)
GRAM STN SPEC: NORMAL
HCT VFR BLD AUTO: 48.2 % (ref 37–47)
HGB BLD-MCNC: 15.5 G/DL (ref 12–16)
LYMPHOCYTES # BLD AUTO: 1.55 K/UL (ref 1–4.8)
LYMPHOCYTES NFR BLD: 9.6 % (ref 22–41)
MANUAL DIFF BLD: NORMAL
MCH RBC QN AUTO: 29.2 PG (ref 27–33)
MCHC RBC AUTO-ENTMCNC: 32.2 G/DL (ref 33.6–35)
MCV RBC AUTO: 90.9 FL (ref 81.4–97.8)
METAMYELOCYTES NFR BLD MANUAL: 0.9 %
MONOCYTES # BLD AUTO: 0.42 K/UL (ref 0–0.85)
MONOCYTES NFR BLD AUTO: 2.6 % (ref 0–13.4)
MORPHOLOGY BLD-IMP: NORMAL
NEUTROPHILS # BLD AUTO: 13.99 K/UL (ref 2–7.15)
NEUTROPHILS NFR BLD: 81.6 % (ref 44–72)
NEUTS BAND NFR BLD MANUAL: 5.3 % (ref 0–10)
NRBC # BLD AUTO: 0 K/UL
NRBC BLD AUTO-RTO: 0 /100 WBC
PLATELET # BLD AUTO: 255 K/UL (ref 164–446)
PLATELET BLD QL SMEAR: NORMAL
PMV BLD AUTO: 10.4 FL (ref 9–12.9)
PROCALCITONIN SERPL-MCNC: 0.17 NG/ML
RBC # BLD AUTO: 5.3 M/UL (ref 4.2–5.4)
RBC BLD AUTO: PRESENT
SIGNIFICANT IND 70042: NORMAL
SITE SITE: NORMAL
SOURCE SOURCE: NORMAL
WBC # BLD AUTO: 16.1 K/UL (ref 4.8–10.8)

## 2017-04-24 PROCEDURE — A9270 NON-COVERED ITEM OR SERVICE: HCPCS | Performed by: HOSPITALIST

## 2017-04-24 PROCEDURE — 85027 COMPLETE CBC AUTOMATED: CPT

## 2017-04-24 PROCEDURE — 85007 BL SMEAR W/DIFF WBC COUNT: CPT

## 2017-04-24 PROCEDURE — 700102 HCHG RX REV CODE 250 W/ 637 OVERRIDE(OP): Performed by: HOSPITALIST

## 2017-04-24 PROCEDURE — 99232 SBSQ HOSP IP/OBS MODERATE 35: CPT | Mod: GC | Performed by: HOSPITALIST

## 2017-04-24 PROCEDURE — 94668 MNPJ CHEST WALL SBSQ: CPT

## 2017-04-24 PROCEDURE — 770020 HCHG ROOM/CARE - TELE (206)

## 2017-04-24 PROCEDURE — 94640 AIRWAY INHALATION TREATMENT: CPT

## 2017-04-24 PROCEDURE — 82962 GLUCOSE BLOOD TEST: CPT | Mod: 91

## 2017-04-24 PROCEDURE — 700105 HCHG RX REV CODE 258: Performed by: HOSPITALIST

## 2017-04-24 PROCEDURE — 700111 HCHG RX REV CODE 636 W/ 250 OVERRIDE (IP): Performed by: HOSPITALIST

## 2017-04-24 PROCEDURE — A9270 NON-COVERED ITEM OR SERVICE: HCPCS | Performed by: STUDENT IN AN ORGANIZED HEALTH CARE EDUCATION/TRAINING PROGRAM

## 2017-04-24 PROCEDURE — 36415 COLL VENOUS BLD VENIPUNCTURE: CPT

## 2017-04-24 PROCEDURE — 700101 HCHG RX REV CODE 250: Performed by: INTERNAL MEDICINE

## 2017-04-24 PROCEDURE — 700102 HCHG RX REV CODE 250 W/ 637 OVERRIDE(OP): Performed by: STUDENT IN AN ORGANIZED HEALTH CARE EDUCATION/TRAINING PROGRAM

## 2017-04-24 PROCEDURE — 700111 HCHG RX REV CODE 636 W/ 250 OVERRIDE (IP): Performed by: STUDENT IN AN ORGANIZED HEALTH CARE EDUCATION/TRAINING PROGRAM

## 2017-04-24 RX ORDER — CEFDINIR 300 MG/1
300 CAPSULE ORAL EVERY 12 HOURS
Status: DISCONTINUED | OUTPATIENT
Start: 2017-04-25 | End: 2017-04-25 | Stop reason: HOSPADM

## 2017-04-24 RX ORDER — PREDNISONE 20 MG/1
40 TABLET ORAL DAILY
Status: DISCONTINUED | OUTPATIENT
Start: 2017-04-24 | End: 2017-04-25 | Stop reason: HOSPADM

## 2017-04-24 RX ORDER — IPRATROPIUM BROMIDE AND ALBUTEROL SULFATE 2.5; .5 MG/3ML; MG/3ML
3 SOLUTION RESPIRATORY (INHALATION)
Status: DISCONTINUED | OUTPATIENT
Start: 2017-04-24 | End: 2017-04-25 | Stop reason: HOSPADM

## 2017-04-24 RX ORDER — LISINOPRIL 5 MG/1
5 TABLET ORAL
Status: DISCONTINUED | OUTPATIENT
Start: 2017-04-24 | End: 2017-04-24

## 2017-04-24 RX ORDER — HYDRALAZINE HYDROCHLORIDE 20 MG/ML
20 INJECTION INTRAMUSCULAR; INTRAVENOUS EVERY 6 HOURS PRN
Status: DISCONTINUED | OUTPATIENT
Start: 2017-04-24 | End: 2017-04-25 | Stop reason: HOSPADM

## 2017-04-24 RX ORDER — LISINOPRIL 5 MG/1
10 TABLET ORAL
Status: DISCONTINUED | OUTPATIENT
Start: 2017-04-25 | End: 2017-04-25 | Stop reason: HOSPADM

## 2017-04-24 RX ORDER — AZITHROMYCIN 250 MG/1
250 TABLET, FILM COATED ORAL DAILY
Status: COMPLETED | OUTPATIENT
Start: 2017-04-24 | End: 2017-04-25

## 2017-04-24 RX ADMIN — AZITHROMYCIN 250 MG: 250 TABLET, FILM COATED ORAL at 15:42

## 2017-04-24 RX ADMIN — LABETALOL HYDROCHLORIDE 10 MG: 5 INJECTION INTRAVENOUS at 04:26

## 2017-04-24 RX ADMIN — ENOXAPARIN SODIUM 40 MG: 100 INJECTION SUBCUTANEOUS at 09:51

## 2017-04-24 RX ADMIN — IPRATROPIUM BROMIDE AND ALBUTEROL SULFATE 3 ML: .5; 3 SOLUTION RESPIRATORY (INHALATION) at 19:20

## 2017-04-24 RX ADMIN — METHYLPREDNISOLONE SODIUM SUCCINATE 62.5 MG: 125 INJECTION, POWDER, FOR SOLUTION INTRAMUSCULAR; INTRAVENOUS at 05:59

## 2017-04-24 RX ADMIN — NICOTINE 21 MG: 21 PATCH, EXTENDED RELEASE TRANSDERMAL at 06:00

## 2017-04-24 RX ADMIN — IPRATROPIUM BROMIDE AND ALBUTEROL SULFATE 3 ML: .5; 3 SOLUTION RESPIRATORY (INHALATION) at 14:30

## 2017-04-24 RX ADMIN — LISINOPRIL 5 MG: 5 TABLET ORAL at 09:52

## 2017-04-24 RX ADMIN — CEFTRIAXONE SODIUM 1 G: 1 INJECTION, POWDER, FOR SOLUTION INTRAMUSCULAR; INTRAVENOUS at 09:51

## 2017-04-24 RX ADMIN — PREDNISONE 40 MG: 20 TABLET ORAL at 15:42

## 2017-04-24 RX ADMIN — IPRATROPIUM BROMIDE AND ALBUTEROL SULFATE 3 ML: .5; 3 SOLUTION RESPIRATORY (INHALATION) at 06:18

## 2017-04-24 ASSESSMENT — ENCOUNTER SYMPTOMS
SPUTUM PRODUCTION: 1
PND: 0
CHILLS: 0
HEADACHES: 0
ORTHOPNEA: 0
CLAUDICATION: 0
BLURRED VISION: 0
NAUSEA: 0
PALPITATIONS: 0
VOMITING: 0
WHEEZING: 1
DEPRESSION: 0
COUGH: 1
FEVER: 0
DIZZINESS: 0
SHORTNESS OF BREATH: 1
NERVOUS/ANXIOUS: 0
MYALGIAS: 0

## 2017-04-24 ASSESSMENT — PAIN SCALES - GENERAL
PAINLEVEL_OUTOF10: 0

## 2017-04-24 ASSESSMENT — LIFESTYLE VARIABLES: EVER_SMOKED: YES

## 2017-04-24 NOTE — CONSULTS
Diabetes Education:  Patient presents with COPD and elevated blood glucose after steroid administration.  HbA1c= 6.3%, demonstrating pre-diabetes.  She reports diabetes on both sides of her family.  Discussed preventing diabetes through diet, exercise and weight loss.  She understands the steroids will raise her blood glucose and possibly her weight for the short term, and that she may require an occasional insulin injection in the hospital.  She has a physical job as a , and agrees to exercise on her days off.  She will decrease her carbohydrate intake, specifically rice, tortillas and sugar in her coffee.  She will need to establish with a PCP for ongoing monitoring of HbA1c as well as COPD.

## 2017-04-24 NOTE — FACE TO FACE
Face to Face Supporting Documentation - Home Health    The encounter with this patient was in whole or in part the primary reason for home health admission.    Date of encounter:   Patient:                    MRN:                       YOB: 2017  Radha Guerin  [id number]  1955     Home health to see patient for: home oxygen evaluation          I certify the face to face encounter for this home health care referral meets the CMS requirements and the encounter/clinical assessment with the patient was, in whole, or in part, for the medical condition(s) listed above, which is the primary reason for home health care. Based on my clinical findings: the service(s) are medically necessary, support the need for home health care, and the homebound criteria are met.  I certify that this patient has had a face to face encounter by   Dimas To M.D. - NPI: [id number]

## 2017-04-24 NOTE — CARE PLAN
Problem: Safety  Goal: Will remain free from falls  Outcome: PROGRESSING AS EXPECTED  Pt up self with treaded slipper socks on.    Problem: Infection  Goal: Will remain free from infection  Outcome: PROGRESSING AS EXPECTED  Pt receiving IV abx and MEWs scores being monitored.

## 2017-04-25 VITALS
HEART RATE: 71 BPM | OXYGEN SATURATION: 93 % | DIASTOLIC BLOOD PRESSURE: 84 MMHG | TEMPERATURE: 97.7 F | RESPIRATION RATE: 18 BRPM | HEIGHT: 60 IN | BODY MASS INDEX: 28.18 KG/M2 | SYSTOLIC BLOOD PRESSURE: 150 MMHG | WEIGHT: 143.52 LBS

## 2017-04-25 LAB
BASOPHILS # BLD AUTO: 0 % (ref 0–1.8)
BASOPHILS # BLD: 0 K/UL (ref 0–0.12)
EOSINOPHIL # BLD AUTO: 0 K/UL (ref 0–0.51)
EOSINOPHIL NFR BLD: 0 % (ref 0–6.9)
ERYTHROCYTE [DISTWIDTH] IN BLOOD BY AUTOMATED COUNT: 43 FL (ref 35.9–50)
GLUCOSE BLD-MCNC: 101 MG/DL (ref 65–99)
GLUCOSE BLD-MCNC: 132 MG/DL (ref 65–99)
GLUCOSE BLD-MCNC: 95 MG/DL (ref 65–99)
HCT VFR BLD AUTO: 46.7 % (ref 37–47)
HGB BLD-MCNC: 15.2 G/DL (ref 12–16)
LYMPHOCYTES # BLD AUTO: 0.46 K/UL (ref 1–4.8)
LYMPHOCYTES NFR BLD: 2.7 % (ref 22–41)
MANUAL DIFF BLD: NORMAL
MCH RBC QN AUTO: 29.3 PG (ref 27–33)
MCHC RBC AUTO-ENTMCNC: 32.5 G/DL (ref 33.6–35)
MCV RBC AUTO: 90 FL (ref 81.4–97.8)
METAMYELOCYTES NFR BLD MANUAL: 0.9 %
MONOCYTES # BLD AUTO: 0.15 K/UL (ref 0–0.85)
MONOCYTES NFR BLD AUTO: 0.9 % (ref 0–13.4)
MORPHOLOGY BLD-IMP: NORMAL
MYELOCYTES NFR BLD MANUAL: 4.4 %
NEUTROPHILS # BLD AUTO: 15.67 K/UL (ref 2–7.15)
NEUTROPHILS NFR BLD: 90.2 % (ref 44–72)
NEUTS BAND NFR BLD MANUAL: 0.9 % (ref 0–10)
NRBC # BLD AUTO: 0 K/UL
NRBC BLD AUTO-RTO: 0 /100 WBC
PLATELET # BLD AUTO: 249 K/UL (ref 164–446)
PLATELET BLD QL SMEAR: NORMAL
PMV BLD AUTO: 10.5 FL (ref 9–12.9)
RBC # BLD AUTO: 5.19 M/UL (ref 4.2–5.4)
RBC BLD AUTO: PRESENT
TOXIC GRANULES BLD QL SMEAR: SLIGHT
WBC # BLD AUTO: 17.2 K/UL (ref 4.8–10.8)

## 2017-04-25 PROCEDURE — 700111 HCHG RX REV CODE 636 W/ 250 OVERRIDE (IP): Performed by: HOSPITALIST

## 2017-04-25 PROCEDURE — 82962 GLUCOSE BLOOD TEST: CPT

## 2017-04-25 PROCEDURE — 700102 HCHG RX REV CODE 250 W/ 637 OVERRIDE(OP): Performed by: HOSPITALIST

## 2017-04-25 PROCEDURE — 700102 HCHG RX REV CODE 250 W/ 637 OVERRIDE(OP): Performed by: STUDENT IN AN ORGANIZED HEALTH CARE EDUCATION/TRAINING PROGRAM

## 2017-04-25 PROCEDURE — 700111 HCHG RX REV CODE 636 W/ 250 OVERRIDE (IP): Performed by: STUDENT IN AN ORGANIZED HEALTH CARE EDUCATION/TRAINING PROGRAM

## 2017-04-25 PROCEDURE — A9270 NON-COVERED ITEM OR SERVICE: HCPCS | Performed by: STUDENT IN AN ORGANIZED HEALTH CARE EDUCATION/TRAINING PROGRAM

## 2017-04-25 PROCEDURE — 85027 COMPLETE CBC AUTOMATED: CPT

## 2017-04-25 PROCEDURE — 85007 BL SMEAR W/DIFF WBC COUNT: CPT

## 2017-04-25 PROCEDURE — 700102 HCHG RX REV CODE 250 W/ 637 OVERRIDE(OP): Performed by: INTERNAL MEDICINE

## 2017-04-25 PROCEDURE — 99239 HOSP IP/OBS DSCHRG MGMT >30: CPT | Mod: GC | Performed by: HOSPITALIST

## 2017-04-25 PROCEDURE — A9270 NON-COVERED ITEM OR SERVICE: HCPCS | Performed by: HOSPITALIST

## 2017-04-25 PROCEDURE — A9270 NON-COVERED ITEM OR SERVICE: HCPCS | Performed by: INTERNAL MEDICINE

## 2017-04-25 RX ORDER — ALBUTEROL SULFATE 90 UG/1
2 AEROSOL, METERED RESPIRATORY (INHALATION) EVERY 6 HOURS PRN
Qty: 8.5 G | Refills: 1 | Status: SHIPPED | OUTPATIENT
Start: 2017-04-25

## 2017-04-25 RX ORDER — AZITHROMYCIN 250 MG/1
250 TABLET, FILM COATED ORAL DAILY
Status: DISCONTINUED | OUTPATIENT
Start: 2017-04-25 | End: 2017-04-25 | Stop reason: HOSPADM

## 2017-04-25 RX ORDER — AZITHROMYCIN 250 MG/1
250 TABLET, FILM COATED ORAL DAILY
Qty: 2 TAB | Refills: 0 | Status: SHIPPED | OUTPATIENT
Start: 2017-04-25 | End: 2022-03-24

## 2017-04-25 RX ORDER — NICOTINE 21 MG/24HR
1 PATCH, TRANSDERMAL 24 HOURS TRANSDERMAL EVERY 24 HOURS
Qty: 30 PATCH | Refills: 1 | Status: SHIPPED | OUTPATIENT
Start: 2017-04-25 | End: 2022-03-24

## 2017-04-25 RX ORDER — CEFDINIR 300 MG/1
300 CAPSULE ORAL 2 TIMES DAILY
Qty: 10 CAP | Refills: 0 | Status: SHIPPED | OUTPATIENT
Start: 2017-04-25 | End: 2017-04-30

## 2017-04-25 RX ORDER — BUDESONIDE AND FORMOTEROL FUMARATE DIHYDRATE 160; 4.5 UG/1; UG/1
2 AEROSOL RESPIRATORY (INHALATION) 2 TIMES DAILY
Qty: 1 INHALER | Refills: 0 | Status: SHIPPED | OUTPATIENT
Start: 2017-04-25 | End: 2017-05-25

## 2017-04-25 RX ORDER — PREDNISONE 20 MG/1
TABLET ORAL
Qty: 30 TAB | Refills: 0 | Status: SHIPPED | OUTPATIENT
Start: 2017-04-25 | End: 2021-12-03

## 2017-04-25 RX ORDER — TIOTROPIUM BROMIDE 18 UG/1
18 CAPSULE ORAL; RESPIRATORY (INHALATION) DAILY
Qty: 30 CAP | Refills: 3 | Status: SHIPPED | OUTPATIENT
Start: 2017-04-25 | End: 2022-03-24

## 2017-04-25 RX ORDER — TIOTROPIUM BROMIDE 18 UG/1
1 CAPSULE ORAL; RESPIRATORY (INHALATION)
Status: DISCONTINUED | OUTPATIENT
Start: 2017-04-25 | End: 2017-04-25 | Stop reason: HOSPADM

## 2017-04-25 RX ORDER — AMLODIPINE BESYLATE 5 MG/1
5 TABLET ORAL DAILY
Qty: 30 TAB | Refills: 0 | Status: SHIPPED | OUTPATIENT
Start: 2017-04-25 | End: 2021-12-03

## 2017-04-25 RX ORDER — BUDESONIDE AND FORMOTEROL FUMARATE DIHYDRATE 160; 4.5 UG/1; UG/1
2 AEROSOL RESPIRATORY (INHALATION)
Status: DISCONTINUED | OUTPATIENT
Start: 2017-04-25 | End: 2017-04-25 | Stop reason: HOSPADM

## 2017-04-25 RX ORDER — AMLODIPINE BESYLATE 5 MG/1
5 TABLET ORAL
Status: DISCONTINUED | OUTPATIENT
Start: 2017-04-25 | End: 2017-04-25 | Stop reason: HOSPADM

## 2017-04-25 RX ADMIN — CEFDINIR 300 MG: 300 CAPSULE ORAL at 08:02

## 2017-04-25 RX ADMIN — AZITHROMYCIN 250 MG: 250 TABLET, FILM COATED ORAL at 08:02

## 2017-04-25 RX ADMIN — NICOTINE 21 MG: 21 PATCH, EXTENDED RELEASE TRANSDERMAL at 05:32

## 2017-04-25 RX ADMIN — AMLODIPINE BESYLATE 5 MG: 5 TABLET ORAL at 07:25

## 2017-04-25 RX ADMIN — TIOTROPIUM BROMIDE 1 CAPSULE: 18 CAPSULE ORAL; RESPIRATORY (INHALATION) at 15:25

## 2017-04-25 RX ADMIN — BUDESONIDE AND FORMOTEROL FUMARATE DIHYDRATE 2 PUFF: 160; 4.5 AEROSOL RESPIRATORY (INHALATION) at 15:25

## 2017-04-25 RX ADMIN — AZITHROMYCIN 250 MG: 250 TABLET, FILM COATED ORAL at 15:25

## 2017-04-25 RX ADMIN — HYDRALAZINE HYDROCHLORIDE 20 MG: 20 INJECTION INTRAMUSCULAR; INTRAVENOUS at 03:56

## 2017-04-25 RX ADMIN — LISINOPRIL 10 MG: 5 TABLET ORAL at 08:03

## 2017-04-25 RX ADMIN — ENOXAPARIN SODIUM 40 MG: 100 INJECTION SUBCUTANEOUS at 08:02

## 2017-04-25 RX ADMIN — PREDNISONE 40 MG: 20 TABLET ORAL at 08:02

## 2017-04-25 ASSESSMENT — PAIN SCALES - GENERAL: PAINLEVEL_OUTOF10: 0

## 2017-04-25 ASSESSMENT — LIFESTYLE VARIABLES: EVER_SMOKED: YES

## 2017-04-25 NOTE — FACE TO FACE
Face to Face Note  -  Durable Medical Equipment    Dr. Dimas To MD - NPI: [id number]  I certify that this patient is under my care and that they had a durable medical equipment(DME)face to face encounter by myself that meets the physician DME face-to-face encounter requirements with this patient on:    Date of encounter:   Patient:                    MRN:                       YOB: 2017  Radha Guerin  [id number]  1955     The encounter with the patient was in whole, or in part, for the following medical condition, which is the primary reason for durable medical equipment:      I certify that, based on my findings, the following durable medical equipment is medically necessary: oxygen tank and compressor       HOME O2 Saturation Measurements:(Values must be present for Home Oxygen orders)  Room air sat at rest: 88  Room air sat with amb: 85  With liters of O2: 3, O2 sat at rest with O2: 93  With Liters of O2: 3, O2 sat with amb with O2 : 91  Is the patient mobile?: Yes    My Clinical findings support the need for the above equipment due to:   COPD, respiratory failure     Supporting Symptoms: SOB and hypoxemia due to COPD

## 2017-04-25 NOTE — PROGRESS NOTES
[facility] [place] Internal Medicine Interval Note    Name Radha Guerin       1955   Age/Sex 61 y.o. female   MRN [id number]   Code Status Full code     After 5PM or if no immediate response to page, please call for cross-coverage  Attending/Team: Dr. Knott Call [phone] to page   1st Call - Day Intern (R1):   Dr. Dimas To MD 2nd Call - Day Sr. Resident (R2/R3):   Dr. KAYA Crabtree MD         Chief complaint/ reason for interval visit (Primary Diagnosis)   COPD exacerbation     Interval Problem Daily Status Update   Currently satting at 90% on 1 to 4 LPM. Reports improvement in SOB, cough and Wheezing from yesterday.  Her BP has been high since yesterday up to 191/101    Active Problems:    Acute respiratory failure with hypoxia and hypercarbia (CMS-HCC) POA: Yes    COPD with exacerbation (CMS-HCC) POA: Yes    Pneumonia POA: Yes    Acute respiratory acidosis POA: Yes    Hyperglycemia POA: Unknown    Tobacco abuse POA: Unknown    Lung nodule POA: Yes    High blood pressure POA: Unknown  Resolved Problems:    * No resolved hospital problems. *      Review of Systems   Constitutional: Negative for fever, chills and malaise/fatigue.   Eyes: Negative for blurred vision.   Respiratory: Positive for cough, sputum production, shortness of breath and wheezing.    Cardiovascular: Negative for chest pain, palpitations, orthopnea, claudication and PND.   Gastrointestinal: Negative for nausea and vomiting.   Genitourinary: Negative for dysuria.   Musculoskeletal: Negative for myalgias.   Skin: Positive for rash.   Neurological: Negative for dizziness and headaches.   Psychiatric/Behavioral: Negative for depression. The patient is not nervous/anxious.        Consultants/Specialty  None    Disposition  Admit for COPD exacerbation    Quality Measures    Carbajal catheter: No Carbajal      DVT Prophylaxis: Enoxaparin (Lovenox)    Ulcer prophylaxis: Not indicated              Physical Exam       Filed  Vitals:    04/24/17 0807 04/24/17 1135 04/24/17 1431 04/24/17 1535   BP: 162/98 191/101  163/75   Pulse: 80 63 71 82   Temp: 35.9 °C (96.7 °F) 36.9 °C (98.4 °F)  36.2 °C (97.2 °F)   Resp: 16 16 17 16   Height:       Weight:       SpO2: 93% 92% 91% 90%     Body mass index is 27.86 kg/(m^2). Weight: 64.7 kg (142 lb 10.2 oz)  Oxygen Therapy:  Pulse Oximetry: 90 %, O2 (LPM): 4, O2 Delivery: Silicone Nasal Cannula    Physical Exam   Constitutional: She is oriented to person, place, and time and well-developed, well-nourished, and in no distress. No distress.   HENT:   Head: Normocephalic.   Eyes: EOM are normal.   Neck: Neck supple.   Cardiovascular: Normal rate and regular rhythm.  Exam reveals no gallop and no friction rub.    No murmur heard.  Pulmonary/Chest: No respiratory distress. She has wheezes. She has no rales.   Abdominal: Soft.   Musculoskeletal: Normal range of motion. She exhibits no edema.   Neurological: She is alert and oriented to person, place, and time. GCS score is 15.   Skin: She is not diaphoretic.   Psychiatric: Mood and affect normal.         Lab Data Review:      4/22/2017  2:34 PM    Recent Labs      04/21/17 1815 04/22/17 0144 04/23/17   0308   SODIUM  138  143  138   POTASSIUM  3.9  3.9  4.5   CHLORIDE  103  107  105   CO2  25  26  25   BUN  17  10  15   CREATININE  0.51  0.44*  0.42*   MAGNESIUM  2.2  2.2   --    CALCIUM  8.9  8.5  9.1       Recent Labs      04/21/17   1815 04/22/17 0144  04/23/17   0308   ALTSGPT  16  17  18   ASTSGOT  12  12  15   ALKPHOSPHAT  50  44  41   TBILIRUBIN  0.4  0.3  0.2   GLUCOSE  143*  230*  157*       Recent Labs      04/22/17 0144 04/23/17   0308  04/24/17   0746   RBC  5.24  4.94  5.30   HEMOGLOBIN  15.4  14.7  15.5   HEMATOCRIT  48.1*  44.9  48.2*   PLATELETCT  222  240  255       Recent Labs      04/21/17   1815  04/22/17   0144  04/23/17   0308  04/24/17   0746   WBC  10.2  8.2  13.5*  16.1*   NEUTSPOLYS  46.00  77.40*   --   81.60*    LYMPHOCYTES  40.70  11.30*   --   9.60*   MONOCYTES  7.10  6.90   --   2.60   EOSINOPHILS  6.20  0.00   --   0.00   BASOPHILS  0.00  0.00   --   0.00   ASTSGOT  12  12  15   --    ALTSGPT  16  17  18   --    ALKPHOSPHAT  50  44  41   --    TBILIRUBIN  0.4  0.3  0.2   --            Assessment/Plan     COPD with exacerbation (CMS-[facility])  Assessment & Plan  # Community-acquired pneumonia  - No pulmonary function tests on file to definitively diagnose COPD though high clinical suspicion given extensive smoking history and clinical picture  - gave hx of  cough, shortness of breath, sputum production for 4 to 6 days before admission; on presentation has fever of 100.9°, heart rate up to 152, respiratory rate up to 22, and required 15 L of supplemental oxygen by oxymask  - Chest x-ray showed increased perihilar interstitial markings indicative of possible atypical infection, no focal infiltrates  - She was given sepsis bolus, azithromycin and doxycycline in the ED  - AB.3/51/65/25  - Lactic acid within normal limits at 1.8  - sputum culture is negative  - Troponin negative, BNP within normal limits 64; EKG showed sinus tachycardia and possible right atrial abnormality    - received Ceftriaxone, azithromycin IV    Plan :  - Oxygen therapy to maintain O2 sat 88-92%  - to shift from IV steroid to prednisone 40 mg PO daily  - to discontinue IV ABx and start azithromycin PO and cefdinir PO   - stop IV fluid  - home oxygen evaluation  - Ipratropium-albuterol nebulizers every 4 hours scheduled, albuterol inhaler every 2 hours as needed. Pre and Post Neb peak flows.  - Guaifenesin every 4 hours as needed  - Incentive spirometry  - to be sent for PFT 6 to  8 weeks after discharge      Pneumonia  Assessment & Plan  - as listed below for COPD exacerbation     Hyperglycemia  Assessment & Plan  - Blood glucose elevated up to 230  - B.glucose today 127  - No history of diabetes the patient is aware of  - A1c 6.3 impaired glucose  tolerance  - possibly due to steroid effect  - ISS, hypoglycemia protocol and finger stick check up      Tobacco abuse  Assessment & Plan  - 40 year tobacco use history smoking one plus packs per day  - States that she quit smoking 5 days ago  - Nicotine replacement    - Tobacco cessation education      Lung nodule  Assessment & Plan  - unchanged (since 2011/2012) 1.8 cm RUL nodule most c/w an old granuloma  - CTM  - Repeat CT in 6 months as per PCP discretion     High blood pressure  Assessment & Plan  BP has been high since yesterday, 191/101, possibly due to steroid effect. No previous hx of HTN.  To start lisinopril 10 mg , hydralazine 20 mg IV PRN

## 2017-04-25 NOTE — DISCHARGE PLANNING
Pt to dc home on oxygen but has no insurance. Met with pt at bedside to review possibility of her being able to pay out of pocket for the oxygen, which she is able to do. Faxed choice form for [employer] to [facility] to send. Th eprice will be $140/month. Await delivery to bedside.

## 2017-04-25 NOTE — CARE PLAN
Problem: Knowledge Deficit  Goal: Knowledge of disease process/condition, treatment plan, diagnostic tests, and medications will improve  Outcome: PROGRESSING AS EXPECTED  Discussed POC with pt. Answered all questions appropriately. White board updated. All medications and interventions explained before performed. Pt verbalized understanding.         Problem: Respiratory:  Goal: Respiratory status will improve  Outcome: PROGRESSING AS EXPECTED  Pt on 4L NC. O2sat monitor in place. O2sat 95%. No signs of distress or discomfort. Home O2 test to be completed before discharge. O2 tubing extended to reach bathroom.

## 2017-04-25 NOTE — CARE PLAN
Problem: Safety  Goal: Will remain free from injury  Intervention: Provide assistance with mobility  Patient instructed to use call light when needing assistance; call light in reach.

## 2017-04-25 NOTE — DISCHARGE PLANNING
[facility], [facility] to pay on delivery per [facility].

## 2017-04-25 NOTE — CARE PLAN
Problem: Infection  Goal: Will remain free from infection  Intervention: Assess signs and symptoms of infection  Patient continues with oral antibiotics.

## 2017-04-25 NOTE — PROGRESS NOTES
Bedside report received, assumed pt care @3852. Pt A&Ox4. She denies any pain. POC discussed, she verbalized understanding. Pt steady on her feet. Call light within reach

## 2017-04-25 NOTE — DISCHARGE INSTRUCTIONS
Discharge Instructions    Discharged to home by car with relative. Discharged via wheelchair, hospital escort: Yes.  Special equipment needed: Oxygen    Be sure to schedule a follow-up appointment with your primary care doctor or any specialists as instructed.     Discharge Plan:   Diet Plan: Discussed  Activity Level: Discussed  Smoking Cessation Offered: Patient Counseled  Confirmed Follow up Appointment: Patient to Call and Schedule Appointment  Confirmed Symptoms Management: Discussed  Medication Reconciliation Updated: Yes  Influenza Vaccine Indication: Patient Refuses    I understand that a diet low in cholesterol, fat, and sodium is recommended for good health. Unless I have been given specific instructions below for another diet, I accept this instruction as my diet prescription.   Other diet: Diabetic    Special Instructions: None    · Is patient discharged on Warfarin / Coumadin?   No     · Is patient Post Blood Transfusion?  No    Cómo evitar los problemas relacionados con la diabetes  (How to Avoid Diabetes Problems)  Usted puede hacer varias cosas para prevenir o disminuir los problemas relacionados con la diabetes. Seguir un plan para la diabetes y cuidarse usted mismo puede reducir el riesgo de complicaciones graves o potencialmente mortales. A continuación, encontrará algunas cosas que puede hacer para prevenir los problemas de la diabetes.  CONTROLE LA DIABETES  Siga las instrucciones de hood médico, enfermera educadora en diabetes y nutricionista para controlar la enfermedad. Le enseñarán los fundamentos para el cuidado de la diabetes. Le ayudar con las preguntas que pueda tener. Aprenda acerca de la diabetes y a ayesha decisiones saludables en materia de alimentación y actividad física. Controle hood nivel de glucosa en la kane con regularidad. El médico le ayudará a decidir con qué frecuencia debe revisar hood nivel de glucosa en la kane, en función de los objetivos de hood tratamiento y el éxito en  cumplirlos.   NO USE NICOTINA  La nicotina y la diabetes son maxine combinación peligrosa. La nicotina aumenta el riesgo de problemas con la diabetes. Si stefani de usar nicotina, reducirá el riesgo de infarto de miocardio, ictus, enfermedades del sistema nervioso y enfermedades renales. Pueden mejorar el colesterol y sylvie niveles de presión arterial. La circulación sanguínea mejorará también. No consuma ningún producto que contenga tabaco, incluidos cigarrillos, tabaco de mascar o cigarrillos electrónicos. Si necesita ayuda para dejar de fumar, hable con el médico.  MANTENGA HOOD PRESIÓN ARTERIAL BAJO CONTROL  El médico determinará cuál debería ser hood presión arterial en función de hood edad, los medicamentos que consuma, el tiempo que hace que tiene diabetes y cualquier otra enfermedad que padezca. La presión arterial consiste en dos números. En general, el objetivo es mantener el número de arriba (presión sistólica) en un valor clifford de 130 y el número de abajo (presión diastólica) en un valor clifford de 80. Si corresponde, el médico recomendará un objetivo de presión arterial con valores más bajos. La planificación de comidas, los medicamentos y el ejercicio pueden ayudarle a alcanzar sylvie objetivos. Asegúrese de que el médico le mida la presión arterial en cada visita.  MANTENGA LOS NIVELES DE COLESTEROL BAJO CONTROL  Los niveles normales de colesterol ayudan a prevenir enfermedades del corazón y el ictus. Estos son los mayores problemas de jaswinder para las personas con diabetes. Mantener los niveles de colesterol bajo control también puede ayudar con el flujo sanguíneo. Controle hood nivel de colesterol por lo menos maxine vez al año. Hood médico puede recetarle un medicamento llamado estatina. Las estatinas reducen el colesterol. Si no está tomando maxine estatina, pregúntele a hood médico si debería tomarla. La planificación de comidas, el ejercicio y los medicamentos pueden ayudar a alcanzar sylvie objetivos relacionados con el nivel  de colesterol.   PLANIFIQUE Y CUMPLA CON SYLVIE EXAMENES FISICOS Y OCULARES ANUALES  El médico le dirá la frecuencia con la que quiere controlarlo en función de hood plan de tratamiento. Es importante que cumpla con estos controles para identificar rápidamente posibles problemas y puedan evitarse o tratarse las complicaciones.  · En cada visita, hood médico debe pesarlo, medirle la presión arterial y evaluar hood control del nivel de glucosa.  · La hemoglobina A1c debe controlarse:  · Por lo menos dos veces al año si usted está en el nivel adecuado.  · Cada 3 meses si hay cambios en el tratamiento.  · Si usted no está alcanzando sylvie objetivos.  · Los lípidos de la kane deben controlarse anualmente. También hay que controlar anualmente la presencia de proteínas en la orina (microalbuminuria).  · Si tiene diabetes tipo 1, programe un examen de fondo de ojos en el período de 5 años a partir del diagnóstico y después maxine vez al año. Si tiene diabetes tipo 2, programe un examen de fondo de ojos cuando reciba el diagnóstico y después maxine vez al año. Los exámenes posteriores deben hacerse cada 2 o 3 años si trista o más exámenes haji sido normales.  MANTÉNGASE AL DÍA CON LAS VACUNAS  Se recomienda que se vacune contra la gripe todos los años. Además, que se vacune contra la neumonía (vacuna antineumocócica). Si es mayor de 65 años y nunca se vacunó contra la neumonía, esta vacuna puede administrarse stephen maxine serie de dos vacunas por separado. Pregúntele al médico qué otras vacunas se pueden recomendar.  CUIDE SYLVIE PIES   La diabetes puede hacer que el flujo sanguíneo (circulación) en las piernas y los pies sea deficiente. Debido a esto, la piel se torna más delgada, se rompe con facilidad y se jose más lentamente. También puede sufrir un daño en los nervios de las piernas y los pies, lo que disminuye la sensibilidad. Es posible que no advierta las heridas más pequeñas que pueden conducir a infecciones graves. El cuidado de los pies es  muy importante.  Se hará maxine inspección visual en cada visita médica de rutina. Estos controles observarán si hay li, lesiones u otros problemas en los pies. Maxine vez por año debe hacerse un examen más intensivo. Phyllis examen incluye la inspección visual y maxine evaluación de los pulsos del pie y la sensibilidad. Usted también debe hacer lo siguiente:  · Examine frances pies todos los días para detectar li, ampollas, callos, uñas encarnadas, y signos de infección, tales stephen enrojecimiento, hinchazón o pus.  · Lave y seque cindy los pies, especialmente entre los dedos.  · Evite sumergir frances pies regularmente en [place].  · Hidrate la piel seca con loción, evitando colocarla entre los dedos.  · Córtese las uñas en línea recta y lime los bordes.  · Evite los zapatos que no calzan cindy o tienen áreas que irritan la piel.  · Evite ir descalzo o con calcetines solamente. Frances pies necesitan protección.  CUIDE FRANCES DIENTES  Las personas con diabetes mal controlada son más propensas a tener enfermedades en las encías (periodontales). Estas infecciones hacen que la diabetes sea difícil de controlar. Las enfermedades periodontales, si se jony sin tratamiento, pueden conducir a la pérdida de dientes. [place] frances dientes dos veces al día, use hilo dental y visite al dentista para los controles y limpieza cada 6 meses o 2 veces al año.  CONSULTE A HOOD MÉDICO SOBRE EL CONSUMO DE ASPIRINA  Jaclyn aspirina a diario se recomienda para ayudar a prevenir la enfermedad cardiovascular en personas con y sin diabetes. Pregúntele a hood médico si esto lo beneficiaría y cuál es la dosis que le recomienda.  SURJIT DE MANERA RESPONSABLE  Las cantidades moderadas de alcohol (menos de 1 bebida al día para mujeres adultas y menores de 2 bebidas al día para hombres adultos) tienen un mínimo efecto sobre la glucosa en la kane si se ingiere con los alimentos. Es importante comer alimentos cuando se shyam alcohol para evitar la hipoglucemia. Las  personas deben evitar el alcohol si tienen un historial de consumo excesivo o dependencia, si es maxine kaycee embarazada, y si tiene maxine enfermedad hepática, pancreatitis, neuropatía avanzada, o hipertrigliceridemia grave.  DISMINUYA EL NIVEL DE ESTRÉS  Vivir con diabetes puede ser estresante. Cuando usted está bajo estrés, el nivel de glucosa en la kane puede verse afectada de dos maneras:  · Las hormonas del estrés pueden hacer que la glucosa en la kane se eleve.  · Probablemente no se cuidó lo suficiente.  Es maxine buena idea estar al tanto del nivel de estrés y hacer los cambios que fifi necesarios para ayudar a manejar mejor las situaciones difíciles. Los grupos de apoyo, maxine relajación planificada, un pasatiempo que le guste, la meditación, las relaciones saludables y hacer ejercicio son factores que ayudan a reducir el nivel de estrés. Si sylvie esfuerzos no parecen tener buenos resultados, pídale ayuda a hood médico o a un profesional de la jaswinder mental capacitado.     Esta información no tiene stephen fin reemplazar el consejo del médico. Asegúrese de hacerle al médico cualquier pregunta que tenga.     Document Released: 12/06/2012 Document Revised: 01/08/2016  [employer] Interactive Patient Education ©2016 [employer]  Enfermedad pulmonar obstructiva crónica  (Chronic Obstructive Pulmonary Disease)  La enfermedad pulmonar obstructiva crónica (EPOC) es maxine enfermedad pulmonar común en la que el flujo de aire que proviene de los pulmones está restringido. EPOC es un término general que puede utilizarse para describir muchas enfermedades pulmonares distintas que restringen el flujo de aire, incluidos bronquitis crónica y enfisema. Si tiene EPOC, el funcionamiento de sylvie pulmones probablemente nunca vuelva a ser normal, katherine puede ayesha medidas para mejorar la función pulmonar y comenzar a sentirse mejor.  CAUSAS   · Fumar (común).  · Exposición al humo stephen fumador pasivo.  · Problemas genéticos.  · Enfermedades  pulmonares inflamatorias crónicas o infecciones recurrentes.  SÍNTOMAS  · Falta de aire, especialmente al realizar actividad física.  · Tos profunda, persistente (crónica) con gran producción de mucosidad espesa.  · Sibilancias.  · Respiraciones rápidas (taquipnea).  · Coloración daria o azulada de la piel (cianosis), especialmente en los dedos de las alyse, de los pies o en los labios.  · Fatiga.  · Pérdida de peso.  · Infecciones frecuentes o episodios en los que los síntomas respiratorios empeoran mucho (exacerbaciones).  · Opresión en el pecho.  DIAGNÓSTICO  El médico le hará maxine historia clínica y un examen físico para diagnosticar la EPOC. Las pruebas adicionales para la EPOC incluyen lo siguiente:  · Pruebas de la función de los pulmones (pulmonar).  · Radiografía de tórax.  · Tomografía computarizada.  · Análisis de kane.  TRATAMIENTO   El tratamiento para la EPOC puede incluir lo siguiente:  · Inhaladores y nebulizadores. Estos ayudan a controlar los síntomas de EPOC y hacen que la respiración sea más cómoda.  · Oxígeno complementario. El oxígeno complementario solo es útil si tiene un nivel bajo de oxígeno en la kane.  · Ejercicios y actividad física. Estos son beneficiosos para prácticamente todas las personas con EPOC.  · Cirugía o trasplante de pulmón.  · Tratamiento nutricional para aumentar de peso, si tiene bajo peso.  · Rehabilitación pulmonar. Esta puede incluir el trabajo con un equipo de médicos y especialistas, stephen terapeutas respiratorios y ocupacionales, y fisioterapeutas.  INSTRUCCIONES PARA EL CUIDADO EN EL HOGAR  · [place] todos los medicamentos (inhalados o píldoras) stephen le indicó el médico.  · Evite los medicamentos de venta oralia o los jarabes para la tos que secan las vías respiratorias (stephen los antihistamínicos) y retardan la eliminación de las secreciones, a menos que el médico le haya indicado lo contrario.  · Si es fumador, lo más importante que puede hacer es dejar el hábito. Si  continúa fumando, el daño a los pulmones será mayor y habrá más problemas respiratorios. Pídale ayuda al médico, si es necesario. Él podrá recomendarle recursos u hospitales comunitarios que brindan apoyo.  · Evite la exposición a factores irritantes, stephen el humo, productos químicos y vapores, porque pueden agravar el trastorno.  · Use la terapia con oxígeno y la rehabilitación pulmonar si se lo indica hood médico. Si requiere terapia con oxígeno en hood casa, consulte al médico si debe comprar un pulsioxímetro para medir hood nivel de oxígeno en casa.  · Evite el contacto con personas que tengan maxine enfermedad contagiosa.  · Evite los cambios extremos de temperatura y humedad.  · Consuma alimentos saludables. Consumir porciones más pequeñas y frecuentes y descansar antes de las comidas puede ayudar a mantener hood fuerza.  · Manténgase activo, katherine equilibre la actividad con períodos de descanso. La práctica de ejercicios y actividad física lo ayudará a mantener la capacidad de hacer las cosas que desea hacer.  · Si tiene EPOC, es muy importante prevenir la infección y la hospitalización. Asegúrese de recibir todas las vacunas que le recomiende el médico, especialmente las vacunas contra el neumococo y la gripe. Pregúntele a hood médico si necesita aplicarse maxine vacuna contra la neumonía.  · Aprenda y utilice técnicas de relajación para controlar el estrés.  · Aprenda y utilice técnicas de respiración controlada según las indicaciones de hood médico. Las técnicas de respiración controlada incluyen lo siguiente:  ¨ Respiración con los labios fruncidos. Comience inspirando (inhalando) por la nariz tete 1 ingrid. Luego frunza los labios stephen si fuera a silbar y espire (exhale) por los labios fruncidos tete 2 segundos.  ¨ Respiración diafragmática. Comience colocando maxine mano sobre el abdomen, emeterio por encima de la cintura. Inhale lentamente por la nariz. La mano que se encuentra sobre el abdomen debe moverse. Luego  frunza los labios y exhale lentamente. Tiene que sentir que la mano que está sobre el abdomen se mueve al exhalar.  · Aprenda y utilice la tos controlada para despejar la mucosidad de los pulmones. La tos controlada consiste en realizar maxine serie de toses cortas y progresivas. Los pasos para la tos controlada son:  1. Incline la manolo ligeramente hacia adelante.  2. Inspire profundamente utilizando la respiración diafragmática.  3. Trate de mantener el aire por 3 segundos.  4. Mantenga la boca ligeramente abierta mientras tose dos veces.  5. Escupa el moco en un pañuelo.  6. Descanse y repita los pasos maxine o dos veces según sea necesario.  SOLICITE ATENCIÓN MÉDICA SI:  · Tose y elimina más moco que lo habitual.  · Hay un cambio en el color o en la consistencia del moco.  · Le antonio respirar más de lo normal.  · La respiración es más rápida que lo habitual.  SOLICITE ATENCIÓN MÉDICA DE INMEDIATO SI:  · Tiene dificultad para respirar mientras está en reposo.  · Tiene dificultad para respirar y esto le impide hacer lo siguiente:  ¨ Poder hablar.  ¨ Realizar las actividades físicas habituales.  · Siente un dolor de pecho que dura más de 5 minutos.  · Tiene la piel más cianótica.  · El pulsioxímetro detecta saturaciones de oxígeno bajas tete más de 5 minutos.  ASEGÚRESE DE QUE:  · Comprende estas instrucciones.  · Controlará hood afección.  · Recibirá ayuda de inmediato si no mejora o si empeora.     Esta información no tiene stephen fin reemplazar el consejo del médico. Asegúrese de hacerle al médico cualquier pregunta que tenga.     Document Released: 09/27/2006 Document Revised: 01/08/2016  [employer] Interactive Patient Education ©2016 [employer]    Depression / Suicide Risk    [facility], it is important to learn how to keep safe from harming yourself.    Recognize the warning signs:  · Abrupt changes in personality, positive or negative- including increase in energy    · Giving away possessions  · Change in eating patterns- significant weight changes-  positive or negative  · Change in sleeping patterns- unable to sleep or sleeping all the time   · Unwillingness or inability to communicate  · Depression  · Unusual sadness, discouragement and loneliness  · Talk of wanting to die  · Neglect of personal appearance   · Rebelliousness- reckless behavior  · Withdrawal from people/activities they love  · Confusion- inability to concentrate     If you or a loved one observes any of these behaviors or has concerns about self-harm, here's what you can do:  · Talk about it- your feelings and reasons for harming yourself  · Remove any means that you might use to hurt yourself (examples: pills, rope, extension cords, firearm)  · Get professional help from the community (Mental Health, Substance Abuse, psychological counseling)  · Do not be alone:Call your Safe Contact- someone whom you trust who will be there for you.  · Call your local CRISIS HOTLINE 175-4592 or 370-053-6338  · Call your local Children's Mobile Crisis Response Team [place] (517) 697-9644 or www.Smart[employer] Ants  · Call the toll free National Suicide Prevention Hotlines   · National Suicide Prevention Lifeline 235-810-SOQO (0850)  · National Hope Line Network 800-SUICIDE (010-5459)

## 2017-04-25 NOTE — PROGRESS NOTES
Assumed care report received. Assessment completed. AOx4. Pt resting in bed, denies any pain at this time. Pt up to bathroom with steady gait. O2 tubing extended to reach bathroom. No other complaints at this time. Plan of care discussed, verbalized understanding. Fall precautions in place. Call light within reach. Tele monitor in place. White board updated.

## 2017-04-25 NOTE — PROGRESS NOTES
Report received from LOREE Banerjee at bedside; patient is awake and alert, sitting at edge of bed, respirations even and unlabored, patient denies any needs at this time, call light in reach.

## 2017-04-25 NOTE — PROGRESS NOTES
Patient resting room air oxygen sat: 88%  Patient resting sat on 3 Liters: 93%  Patient's ambulating room air sat: 85%  Patient's ambulating sat on 2 Liters: 87%  Patient's ambulating sat on 3 Liters: 91%

## 2017-04-25 NOTE — ASSESSMENT & PLAN NOTE
BP was high yesterday, 191/101, possibly due to steroid effect. No previous hx of HTN.   started lisinopril 10 mg amlodipine  5 mg, hydralazine 20 mg IV PRN.  Discharged on amlodipine 5 mg. PCP to follow that

## 2017-04-26 ENCOUNTER — PATIENT OUTREACH (OUTPATIENT)
Dept: HEALTH INFORMATION MANAGEMENT | Facility: OTHER | Age: 62
End: 2017-04-26

## 2017-04-26 LAB
BACTERIA BLD CULT: NORMAL
BACTERIA BLD CULT: NORMAL
SIGNIFICANT IND 70042: NORMAL
SIGNIFICANT IND 70042: NORMAL
SITE SITE: NORMAL
SITE SITE: NORMAL
SOURCE SOURCE: NORMAL
SOURCE SOURCE: NORMAL

## 2017-04-26 NOTE — DISCHARGE SUMMARY
[facility] [place] Internal Medicine Discharge Summary      Admit Date:  2017       Discharge Date:  2017    Service:   R Internal Medicine white team Team  Attending Physician(s):   Dr. Nikos GARCIA       Senior Resident(s):   Dr. Abeba Crabtree MD  Kevin Resident(s):   Dr. Dimas To MD      Primary Diagnosis:  Chronic obstructive pulmonary disease     Secondary Diagnoses:                Active Problems:    Acute respiratory failure with hypoxia and hypercarbia (CMS-HCC) POA: Yes    COPD with exacerbation (CMS-HCC) POA: Yes    Pneumonia POA: Yes    Acute respiratory acidosis POA: Yes    Hyperglycemia POA: Unknown    Tobacco abuse POA: Unknown    Lung nodule POA: Yes    High blood pressure POA: Unknown  Resolved Problems:    * No resolved hospital problems. *      Hospital Summary (Brief Narrative):         # COPD exacerbation :    - presented with hx of  cough, shortness of breath, sputum production for 4 to 6 days before admission; on presentation has fever of 100.9°, heart rate up to 152, respiratory rate up to 22, and required 15 L of supplemental oxygen by oxy mask  - No pulmonary function tests on file to definitively diagnose COPD though high clinical suspicion given extensive smoking history and clinical picture   - Chest x-ray showed increased perihilar interstitial markings indicative of possible atypical infection, no focal infiltrates  - She was given sepsis bolus, azithromycin and doxycycline in the ED  - AB.3/51/65/25  - Lactic acid within normal limits at 1.8  - sputum culture is negative  - Troponin negative, BNP within normal limits 64; EKG showed sinus tachycardia and possible right atrial abnormality      So it is case of COPD exacerbation most likely vs superimposed community acquired pneumonia which less likely    - received Ceftriaxone, azithromycin IV for 4 days and then shifted to  azithromycin PO and cefdinir PO   - received Oxygen therapy to maintain O2 sat 88-92%.  She  needed 4 LPM to keep O2 saturation at this level  - received methylprednisolone 125 mg Q6 H IV for 4 days then shifted to prednisone 40 mg PO daily   - received Ipratropium-albuterol nebulizers every 4 hours scheduled, albuterol inhaler every 2 hours as needed. Pre and Post Neb peak flows.  - Guaifenesin every 4 hours as needed  - Incentive spirometry    Home O2 evaluation showed     Patient resting room air oxygen sat: 88%  Patient resting sat on 3 Liters: 93%  Patient's ambulating room air sat: 85%  Patient's ambulating sat on 2 Liters: 87%  Patient's ambulating sat on 3 Liters: 91%    On day of discharge her SOB, cough has improved significantly. Her Blood pressure was high up to 190/100 and needed to stay for another day  For better control.  Discharged on prednisone on tapering doses over 10 days starting with 40 mg PO, azithromycin PO for total 5 days and cefdinir PO for total 10 days, tiotropium and symbicort, albuterol inhalars. Home oxygen prescribed.   to be sent for PFT 6 to  8 weeks after discharge, PCP to follow that    Patient /Hospital Summary (Details -- Problem Oriented) :          COPD with exacerbation (CMS-[facility])  Assessment & Plan  See hospital course       Pneumonia  Assessment & Plan  - as listed below for COPD exacerbation     Hyperglycemia  Assessment & Plan  - Blood glucose elevated up to 230  - No history of diabetes the patient is aware of  - A1c 6.3 impaired glucose tolerance  - possibly due to steroid effect  - ISS, hypoglycemia protocol and finger stick check up  - controlled on discharge      Tobacco abuse  Assessment & Plan  - 40 year tobacco use history smoking one plus packs per day  - States that she quit smoking 5 days ago  - Nicotine replacement    - Tobacco cessation education      Lung nodule  Assessment & Plan  - unchanged (since 2011/2012) 1.8 cm RUL nodule most c/w an old granuloma  - CTM  - Repeat CT in 6 months as per PCP discretion     High blood pressure  Assessment &  Plan   BP was high yesterday, 191/101, possibly due to steroid effect. No previous hx of HTN.   started lisinopril 10 mg amlodipine  5 mg, hydralazine 20 mg IV PRN.  Discharged on amlodipine 5 mg. PCP to follow that        Consultants:     None     Procedures:        none    Imaging/ Testing:        CXR:  Increased perihilar interstitial markings are suggestive of atypical infection/peribronchial thickening.    Chest CT scan :   1. Evidence of old granulomatous disease.  No active disease in the chest.    2. 1 cm calcification near the tip of the gallbladder, possibly a gallstone.  Correlation with ultrasound if indicated.    Discharge Medications:         Medication Reconciliation: Completed        Medication List      START taking these medications       Instructions    albuterol 108 (90 BASE) MCG/ACT Aers inhalation aerosol   Last time this was given:  2 Puffs on 4/23/2017  1:11 AM   Next Dose Due:  As needed for shortness of breath    Inhale 2 Puffs by mouth every 6 hours as needed for Shortness of Breath.   Dose:  2 Puff       amlodipine 5 MG Tabs   Last time this was given:  5 mg on 4/25/2017  7:25 AM   Commonly known as:  NORVASC   Next Dose Due:  4/26 AM    Take 1 Tab by mouth every day.   Dose:  5 mg       azithromycin 250 MG Tabs   Last time this was given:  250 mg on 4/25/2017  3:25 PM   Commonly known as:  ZITHROMAX   Next Dose Due:  4/26 AM    Take 1 Tab by mouth every day.   Dose:  250 mg       budesonide-formoterol 160-4.5 MCG/ACT Aero   Last time this was given:  2 Puffs on 4/25/2017  3:25 PM   Commonly known as:  SYMBICORT   Next Dose Due:  2 times a day    Inhale 2 Puffs by mouth 2 Times a Day for 30 days.   Dose:  2 Puff       cefdinir 300 MG Caps   Last time this was given:  300 mg on 4/25/2017  8:02 AM   Commonly known as:  OMNICEF   Next Dose Due:  4/26 AM    Take 1 Cap by mouth 2 times a day for 5 days.   Dose:  300 mg       guaiFENesin 100 MG/5ML Soln   Last time this was given:  200 mg on  4/22/2017  8:39 PM   Commonly known as:  ROBITUSSIN   Next Dose Due:  As needed for cough    Take 10 mL by mouth every four hours as needed for Cough.   Dose:  10 mL       nicotine 21 MG/24HR Pt24   Last time this was given:  21 mg on 4/25/2017  5:32 AM   Commonly known as:  NICODERM   Next Dose Due:  4/26     Apply 1 Patch to skin as directed every 24 hours.   Dose:  1 Patch       nicotine polacrilex 2 MG Gum   Commonly known as:  NICORETTE   Next Dose Due:  4/26     Take 1 Each by mouth every 1 hour as needed (For nicotine urge).   Dose:  2 mg       predniSONE 20 MG Tabs   Last time this was given:  40 mg on 4/25/2017  8:02 AM   Commonly known as:  DELTASONE   Next Dose Due:  See directions    Doctor's comments:  Takes 30 mg for 2 days then 20 mg for 2 days, then  10 mg for 2 days, 5 mg for 2 days then 5 mg every other day and then stop   Takes 30 mg for 2 days then 20 mg for 2 days, then  10 mg for 2 days, 5 mg for 2 days then 5 mg every other day and then stop       tiotropium 18 MCG Caps   Last time this was given:  1 Cap on 4/25/2017  3:25 PM   Commonly known as:  SPIRIVA   Next Dose Due:  4/26     Inhale 1 Cap by mouth every day.   Dose:  18 mcg               Disposition:   home    Diet:   Regular diet    Activity:   As tolerated     Instructions:         The patient was instructed to return to the ER in the event of worsening symptoms. I have counseled the patient on the importance of compliance and the patient has agreed to proceed with all medical recommendations and follow up plan indicated above.   The patient understands that all medications come with benefits and risks. Risks may include permanent injury or death and these risks can be minimized with close reassessment and monitoring.        Primary Care Provider:    Discharge summary faxed to primary care provider:  Deferred  Copy of discharge summary given to the patient: Deferred    Follow up appointment details :        Needs follow up with PCP for  her COPD, needs PFT  Need follow up for her BP    Pending Studies:        none    Time spent on discharge day patient visit, preparing discharge paperwork and arranging for patient follow up.    Summary of follow up issues:   COPD and HTN    Discharge Time (Minutes) :    40      Condition on Discharge   ______________________________________________________________________    Interval history/exam for day of discharge:       SOB has improved, less cough, afebrile.    Filed Vitals:    04/25/17 0357 04/25/17 0726 04/25/17 1100 04/25/17 1548   BP: 177/87 158/94 147/97 150/84   Pulse: 68 88 92 71   Temp: 36.4 °C (97.6 °F) 36.6 °C (97.9 °F) 36.6 °C (97.8 °F) 36.5 °C (97.7 °F)   Resp: 17 16 16 18   Height:       Weight:       SpO2: 96% 94% 93% 93%     Weight/BMI: Body mass index is 28.03 kg/(m^2).  Pulse Oximetry: 93 %, O2 (LPM): 4, O2 Delivery: Silicone Nasal Cannula    Constitutional: She is oriented to person, place, and time and well-developed, well-nourished, and in no distress.     Head: Normocephalic.   Eyes: EOM are normal.   Neck: Neck supple.   Cardiovascular: Normal rate and regular rhythm.  Exam reveals no gallop and no friction rub.     No murmur heard.  Pulmonary/Chest: No respiratory distress. She has scattered wheezes. She has no rales.   Abdominal: Soft.       Most Recent Labs:    Lab Results   Component Value Date/Time    WBC 17.2* 04/25/2017 03:19 AM    RBC 5.19 04/25/2017 03:19 AM    HEMOGLOBIN 15.2 04/25/2017 03:19 AM    HEMATOCRIT 46.7 04/25/2017 03:19 AM    MCV 90.0 04/25/2017 03:19 AM    MCH 29.3 04/25/2017 03:19 AM    MCHC 32.5* 04/25/2017 03:19 AM    MPV 10.5 04/25/2017 03:19 AM    NEUTROPHILS-POLYS 90.20* 04/25/2017 03:19 AM    LYMPHOCYTES 2.70* 04/25/2017 03:19 AM    MONOCYTES 0.90 04/25/2017 03:19 AM    EOSINOPHILS 0.00 04/25/2017 03:19 AM    BASOPHILS 0.00 04/25/2017 03:19 AM    HYPOCHROMIA 1+ 01/31/2012 02:45 AM    ANISOCYTOSIS 1+ 04/24/2017 07:46 AM      Lab Results   Component Value  Date/Time    SODIUM 138 04/23/2017 03:08 AM    POTASSIUM 4.5 04/23/2017 03:08 AM    CHLORIDE 105 04/23/2017 03:08 AM    CO2 25 04/23/2017 03:08 AM    GLUCOSE 157* 04/23/2017 03:08 AM    BUN 15 04/23/2017 03:08 AM    CREATININE 0.42* 04/23/2017 03:08 AM      Lab Results   Component Value Date/Time    ALT(SGPT) 18 04/23/2017 03:08 AM    AST(SGOT) 15 04/23/2017 03:08 AM    ALKALINE PHOSPHATASE 41 04/23/2017 03:08 AM    TOTAL BILIRUBIN 0.2 04/23/2017 03:08 AM    ALBUMIN 3.4 04/23/2017 03:08 AM    GLOBULIN 2.8 04/23/2017 03:08 AM    INR 0.94 01/29/2012 07:45 PM    MACROCYTOSIS 1+ 04/22/2017 01:44 AM     Lab Results   Component Value Date/Time    PT 12.7 01/29/2012 07:45 PM    INR 0.94 01/29/2012 07:45 PM

## 2017-04-26 NOTE — PROGRESS NOTES
Discharge instructions reviewed with patient, states understanding, IV discontinued, heart monitor removed; patient's ride will be here around 5:30 tonight.

## 2017-05-17 NOTE — ADDENDUM NOTE
Encounter addended by: Ivanna Whalen R.N. on: 5/17/2017  8:38 AM<BR>     Documentation filed: Inpatient Document Flowsheet

## 2021-03-03 DIAGNOSIS — Z23 NEED FOR VACCINATION: ICD-10-CM

## 2021-12-02 ENCOUNTER — HOSPITAL ENCOUNTER (EMERGENCY)
Facility: MEDICAL CENTER | Age: 66
End: 2021-12-03
Attending: EMERGENCY MEDICINE

## 2021-12-02 DIAGNOSIS — B02.9 HERPES ZOSTER WITHOUT COMPLICATION: ICD-10-CM

## 2021-12-02 DIAGNOSIS — I10 HYPERTENSION, UNSPECIFIED TYPE: ICD-10-CM

## 2021-12-02 PROCEDURE — 93005 ELECTROCARDIOGRAM TRACING: CPT

## 2021-12-02 PROCEDURE — A9270 NON-COVERED ITEM OR SERVICE: HCPCS | Performed by: EMERGENCY MEDICINE

## 2021-12-02 PROCEDURE — 99284 EMERGENCY DEPT VISIT MOD MDM: CPT

## 2021-12-02 PROCEDURE — 93005 ELECTROCARDIOGRAM TRACING: CPT | Performed by: EMERGENCY MEDICINE

## 2021-12-02 PROCEDURE — 700102 HCHG RX REV CODE 250 W/ 637 OVERRIDE(OP): Performed by: EMERGENCY MEDICINE

## 2021-12-02 RX ORDER — PREDNISONE 20 MG/1
60 TABLET ORAL ONCE
Status: COMPLETED | OUTPATIENT
Start: 2021-12-03 | End: 2021-12-03

## 2021-12-02 RX ORDER — HYDROCODONE BITARTRATE AND ACETAMINOPHEN 5; 325 MG/1; MG/1
2 TABLET ORAL ONCE
Status: COMPLETED | OUTPATIENT
Start: 2021-12-03 | End: 2021-12-03

## 2021-12-02 RX ORDER — AMLODIPINE BESYLATE 5 MG/1
10 TABLET ORAL ONCE
Status: COMPLETED | OUTPATIENT
Start: 2021-12-02 | End: 2021-12-02

## 2021-12-02 RX ADMIN — AMLODIPINE BESYLATE 10 MG: 5 TABLET ORAL at 23:28

## 2021-12-03 VITALS
HEIGHT: 60 IN | WEIGHT: 140.87 LBS | BODY MASS INDEX: 27.66 KG/M2 | SYSTOLIC BLOOD PRESSURE: 216 MMHG | HEART RATE: 75 BPM | DIASTOLIC BLOOD PRESSURE: 100 MMHG | TEMPERATURE: 99 F | RESPIRATION RATE: 18 BRPM | OXYGEN SATURATION: 89 %

## 2021-12-03 LAB
EKG IMPRESSION: NORMAL
GLUCOSE BLD-MCNC: 109 MG/DL (ref 65–99)

## 2021-12-03 PROCEDURE — A9270 NON-COVERED ITEM OR SERVICE: HCPCS | Performed by: EMERGENCY MEDICINE

## 2021-12-03 PROCEDURE — 82962 GLUCOSE BLOOD TEST: CPT

## 2021-12-03 PROCEDURE — 700102 HCHG RX REV CODE 250 W/ 637 OVERRIDE(OP): Performed by: EMERGENCY MEDICINE

## 2021-12-03 PROCEDURE — 700111 HCHG RX REV CODE 636 W/ 250 OVERRIDE (IP): Performed by: EMERGENCY MEDICINE

## 2021-12-03 RX ORDER — PREDNISONE 20 MG/1
60 TABLET ORAL DAILY
Qty: 21 TABLET | Refills: 0 | Status: SHIPPED | OUTPATIENT
Start: 2021-12-03 | End: 2021-12-10

## 2021-12-03 RX ORDER — AMLODIPINE BESYLATE 10 MG/1
10 TABLET ORAL DAILY
Qty: 30 TABLET | Refills: 2 | Status: SHIPPED | OUTPATIENT
Start: 2021-12-03

## 2021-12-03 RX ORDER — HYDROCODONE BITARTRATE AND ACETAMINOPHEN 5; 325 MG/1; MG/1
1-2 TABLET ORAL EVERY 6 HOURS PRN
Qty: 21 TABLET | Refills: 0 | Status: SHIPPED | OUTPATIENT
Start: 2021-12-03 | End: 2021-12-10

## 2021-12-03 RX ADMIN — HYDROCODONE BITARTRATE AND ACETAMINOPHEN 2 TABLET: 5; 325 TABLET ORAL at 00:07

## 2021-12-03 RX ADMIN — PREDNISONE 60 MG: 20 TABLET ORAL at 00:07

## 2021-12-03 NOTE — ED NOTES
Discharge instructions discussed with pt, verbalizes understanding. All belongings with pt when leaving unit. Pt amb with steady gait to lobby. Pt's son driving pt home.

## 2021-12-03 NOTE — DISCHARGE INSTRUCTIONS
Do not drive, operate any machinery, or partake in dangerous activities that require maximum physical and mental performance while taking the prescribed pain killer. Do not take tylenol or tylenol containing products in addition to the pain killer that was prescibed, as the excess tylenol can cause life threatening liver problems. Do not combine this drug with alcohol or other sedatives or narcotics. Follow up with your primary care doctor in regards to the future management of this medication.      WE did refer you to primary care you should receive a call next week to schedule an appointment    You can also call or walk into the [facility] clinic

## 2021-12-03 NOTE — ED TRIAGE NOTES
Chief Complaint   Patient presents with   • Rash     pt started to have pain in abd last week, now has a rash on her right side of abd and back, red and blister like rash      hx of COPD and HTN    BP (!) 223/128 Comment: RIGHT /128 LEFT /120  Pulse 83   Temp 37.3 °C (99.1 °F) (Temporal)   Resp 18   Ht 1.524 m (5')   Wt 63.9 kg (140 lb 14 oz)   SpO2 90%   BMI 27.51 kg/m²

## 2021-12-03 NOTE — ED PROVIDER NOTES
"ED Provider Note    CHIEF COMPLAINT  Chief Complaint   Patient presents with   • Rash     pt started to have pain in abd last week, now has a rash on her right side of abd and back, red and blister like rash       HPI  Radha Guerin is a 66 y.o. female who presents to the emergency department chief complaint of pain to the right side of her abdomen and now a rash.  The patient states over the last week she has had severe pain in her right abdomen she states it started as a dull discomfort but over the last week he got really sharp and then she noticed a rash that started 2 days ago.  She has had no nausea vomiting no fevers chills no headache no chest pain no shortness of breath.  The patient used to have medications for her COPD and hypertension but states she has not seen a primary care provider in a long time so does not take any meds.  She states every now and then her head will feel \"heavy\" but she denies current headache dizziness vision changes or complaint beyond discomfort at the site of the rash    REVIEW OF SYSTEMS  Positives as above. Pertinent negatives include nausea vomiting fevers chills cough congestion chest pain shortness of breath chest pain on exertion dyspnea on exertion unilateral bilateral leg swelling headache neck stiffness unilateral weakness numbness tingling vision changes  All other review of systems are negative    PAST MEDICAL HISTORY   has a past medical history of Chronic obstructive pulmonary disease (HCC) and Hypertension.    SOCIAL HISTORY  Social History     Tobacco Use   • Smoking status: Current Every Day Smoker     Packs/day: 1.00   • Smokeless tobacco: Not on file   Substance and Sexual Activity   • Alcohol use: No   • Drug use: No   • Sexual activity: Not on file       SURGICAL HISTORY  patient denies any surgical history    CURRENT MEDICATIONS  Home Medications     Reviewed by Niecy Valle R.N. (Registered Nurse) on 12/02/21 at 6441  Med List Status: Not Addressed "   Medication Last Dose Status   albuterol 108 (90 BASE) MCG/ACT Aero Soln inhalation aerosol  Active   amlodipine (NORVASC) 5 MG Tab  Active   azithromycin (ZITHROMAX) 250 MG Tab  Active   guaiFENesin (ROBITUSSIN) 100 MG/5ML Solution  Active   nicotine (NICODERM) 21 MG/24HR PATCH 24 HR  Active   nicotine polacrilex (NICORETTE) 2 MG Gum  Active   predniSONE (DELTASONE) 20 MG Tab  Active   tiotropium (SPIRIVA) 18 MCG Cap  Active                ALLERGIES  No Known Allergies    PHYSICAL EXAM  VITAL SIGNS: BP (!) 223/128 Comment: RIGHT /128 LEFT /120  Pulse 83   Temp 37.3 °C (99.1 °F) (Temporal)   Resp 18   Ht 1.524 m (5')   Wt 63.9 kg (140 lb 14 oz)   SpO2 90%   BMI 27.51 kg/m²    Pulse ox interpretation: I interpret this pulse ox as normal.  Constitutional: Alert in no apparent distress.  HENT: Normocephalic atraumatic, MMM  Eyes: PER, Conjunctiva normal, Non-icteric.   Cardiovascular: Regular rate and rhythm, no murmurs.   Thorax & Lungs: Normal breath sounds, No respiratory distress, No wheezing, No chest tenderness.   Abdomen: Bowel sounds normal, Soft, No tenderness, No pulsatile masses. No peritoneal signs.  Skin: Warm, Dry -right anterior abdominal wall and the right back wall all in one linear distribution or evidence of erythematous vesicular rash  Extremities/MSK: Intact equal distal pulses, No edema, No tenderness, No cyanosis, no major deformities noted  Neurologic: Alert and oriented x3, No focal deficits noted.       DIFFERENTIAL DIAGNOSIS AND WORK UP PLAN    This is a 66 y.o. female who presents with evidence of zoster on her right back and abdomen there is a clear demarcation of the midline and it is linear consistent with a herpes zoster infection.  She is quite hypertensive but currently states she has no symptoms.  Patient likely is chronically hypertensive is probably been elevated this way for quite some time but currently denies chest pain shortness of breath headache or  dizziness or decrease in urine output.  Should be treated for repair pain and given an oral Norvasc is that is what she used to take according to our records will check to make sure she is not a diabetic she will receive a dose of steroids here    DIAGNOSTIC STUDIES / PROCEDURES    EKG  Results for orders placed or performed during the hospital encounter of 21   EKG   Result Value Ref Range    Report       [facility] Emergency Dept.    Test Date:  2021  Pt Name:    ANMOL SIDDIQUI              Department: ER  MRN:        [id number]                      Room:  Gender:     Female                       Technician: [id number]  :        [id number]                   Requested By:ER TRIAGE PROTOCOL  Order #:    [id number]                    Reading MD: Agnieszka Louis MD    Measurements  Intervals                                Axis  Rate:       86                           P:          69  SC:         143                          QRS:        -27  QRSD:       96                           T:          84  QT:         372  QTc:        445    Interpretive Statements  Sinus rhythm and rate 86 no ST elevations or ST depressions no abnormal T  wave  inversions LVH is consistent no pathognomonic Q waves normal intervals  borderline axis  Compared to ECG 2017 17:35:13  Sinus rhythm unchanged from prior beyond tachycardia  Electronically Signed On 12-3-2021 1:10:09 PST by Artur Louis MD         COURSE & MEDICAL DECISION MAKING  Pertinent Labs & Imaging studies reviewed. (See chart for details)    Patient is currently asymptomatic in terms of her hypertension EKG was performed solely based on the fact that she was hypertensive.  However she does not have any chest pain she has evidence of herpes zoster on my exam.  She be treated with oral pain management as well as oral steroids Accu-Chek was within normal limits.  We discussed the importance of steroids at home she was referred both to our  primary care through the hospital as well as given hopes information for the clinic.  She was written for 3 months of amlodipine with strict return precautions for any new or worsening issues.  She understands feels comfortable going home with her son    The patient remains quite hypertensive according ASAP guidelines without acute symptoms the patient does not require work-up just for outpatient follow-up    She understands that she is contagious until all the vesicles are gone    BP (!) 216/100   Pulse 75   Temp 37.2 °C (99 °F) (Temporal)   Resp 18   Ht 1.524 m (5')   Wt 63.9 kg (140 lb 14 oz)   SpO2 89%   BMI 27.51 kg/m²       I verified that the patient was wearing a mask and I was wearing appropriate PPE every time I entered the room. The patient's mask was on the patient at all times during my encounter except for a brief view of the oropharynx.    The patient will return for new or worsening symptoms and is stable at the time of discharge.    The patient is referred to a primary physician for blood pressure management, diabetic screening, and for all other preventative health concerns.    DISPOSITION:  Patient will be discharged home in stable condition.    FOLLOW UP:  [facility], Emergency Dept  [address] [facility] [address] [phone]    If symptoms worsen    [address] [address] [phone]  Call         OUTPATIENT MEDICATIONS:  Discharge Medication List as of 12/3/2021 12:36 AM      START taking these medications    Details   HYDROcodone-acetaminophen (NORCO) 5-325 MG Tab per tablet Take 1-2 Tablets by mouth every 6 hours as needed (severe pain) for up to 7 days., Disp-21 Tablet, R-0, Normal                 FINAL IMPRESSION  1. Herpes zoster without complication  HYDROcodone-acetaminophen (NORCO) 5-325 MG Tab per tablet    predniSONE (DELTASONE) 20 MG Tab   2. Hypertension, unspecified type  Referral to establish with  [facility] PCP    amLODIPine (NORVASC) 10 MG Tab           Electronically signed by: Agnieszka Louis M.D., 12/2/2021 11:18 PM    This dictation has been created using voice recognition software and/or scribes. The accuracy of the dictation is limited by the abilities of the software and the expertise of the scribes. I expect there may be some errors of grammar and possibly content. I made every attempt to manually correct the errors within my dictation. However, errors related to voice recognition software and/or scribes may still exist and should be interpreted within the appropriate context.

## 2022-03-24 ENCOUNTER — APPOINTMENT (OUTPATIENT)
Dept: RADIOLOGY | Facility: MEDICAL CENTER | Age: 67
DRG: 190 | End: 2022-03-24
Attending: EMERGENCY MEDICINE

## 2022-03-24 ENCOUNTER — HOSPITAL ENCOUNTER (INPATIENT)
Facility: MEDICAL CENTER | Age: 67
LOS: 3 days | DRG: 190 | End: 2022-03-27
Attending: EMERGENCY MEDICINE | Admitting: INTERNAL MEDICINE

## 2022-03-24 DIAGNOSIS — J44.1 COPD WITH EXACERBATION (HCC): ICD-10-CM

## 2022-03-24 DIAGNOSIS — J44.1 COPD EXACERBATION (HCC): ICD-10-CM

## 2022-03-24 DIAGNOSIS — J22 LOWER RESPIRATORY INFECTION: ICD-10-CM

## 2022-03-24 DIAGNOSIS — J44.1 ACUTE EXACERBATION OF CHRONIC OBSTRUCTIVE PULMONARY DISEASE (COPD) (HCC): ICD-10-CM

## 2022-03-24 DIAGNOSIS — R09.02 HYPOXIA: ICD-10-CM

## 2022-03-24 PROBLEM — I16.0 HYPERTENSIVE URGENCY: Status: ACTIVE | Noted: 2022-03-24

## 2022-03-24 LAB
ALBUMIN SERPL BCP-MCNC: 4.3 G/DL (ref 3.2–4.9)
ALBUMIN/GLOB SERPL: 1.6 G/DL
ALP SERPL-CCNC: 60 U/L (ref 30–99)
ALT SERPL-CCNC: 25 U/L (ref 2–50)
ANION GAP SERPL CALC-SCNC: 12 MMOL/L (ref 7–16)
AST SERPL-CCNC: 16 U/L (ref 12–45)
BASOPHILS # BLD AUTO: 0.5 % (ref 0–1.8)
BASOPHILS # BLD: 0.05 K/UL (ref 0–0.12)
BILIRUB SERPL-MCNC: 0.2 MG/DL (ref 0.1–1.5)
BUN SERPL-MCNC: 14 MG/DL (ref 8–22)
CALCIUM SERPL-MCNC: 9.2 MG/DL (ref 8.5–10.5)
CHLORIDE SERPL-SCNC: 104 MMOL/L (ref 96–112)
CO2 SERPL-SCNC: 25 MMOL/L (ref 20–33)
CREAT SERPL-MCNC: 0.46 MG/DL (ref 0.5–1.4)
EKG IMPRESSION: NORMAL
EOSINOPHIL # BLD AUTO: 0.14 K/UL (ref 0–0.51)
EOSINOPHIL NFR BLD: 1.3 % (ref 0–6.9)
ERYTHROCYTE [DISTWIDTH] IN BLOOD BY AUTOMATED COUNT: 45.1 FL (ref 35.9–50)
GFR SERPLBLD CREATININE-BSD FMLA CKD-EPI: 105 ML/MIN/1.73 M 2
GLOBULIN SER CALC-MCNC: 2.7 G/DL (ref 1.9–3.5)
GLUCOSE SERPL-MCNC: 94 MG/DL (ref 65–99)
HCT VFR BLD AUTO: 51.4 % (ref 37–47)
HGB BLD-MCNC: 16.9 G/DL (ref 12–16)
IMM GRANULOCYTES # BLD AUTO: 0.03 K/UL (ref 0–0.11)
IMM GRANULOCYTES NFR BLD AUTO: 0.3 % (ref 0–0.9)
LACTATE BLD-SCNC: 0.9 MMOL/L (ref 0.5–2)
LYMPHOCYTES # BLD AUTO: 2.19 K/UL (ref 1–4.8)
LYMPHOCYTES NFR BLD: 21 % (ref 22–41)
MCH RBC QN AUTO: 30.8 PG (ref 27–33)
MCHC RBC AUTO-ENTMCNC: 32.9 G/DL (ref 33.6–35)
MCV RBC AUTO: 93.6 FL (ref 81.4–97.8)
MONOCYTES # BLD AUTO: 1.12 K/UL (ref 0–0.85)
MONOCYTES NFR BLD AUTO: 10.7 % (ref 0–13.4)
NEUTROPHILS # BLD AUTO: 6.92 K/UL (ref 2–7.15)
NEUTROPHILS NFR BLD: 66.2 % (ref 44–72)
NRBC # BLD AUTO: 0 K/UL
NRBC BLD-RTO: 0 /100 WBC
NT-PROBNP SERPL IA-MCNC: 445 PG/ML (ref 0–125)
PLATELET # BLD AUTO: 239 K/UL (ref 164–446)
PMV BLD AUTO: 10.1 FL (ref 9–12.9)
POTASSIUM SERPL-SCNC: 4.5 MMOL/L (ref 3.6–5.5)
PROT SERPL-MCNC: 7 G/DL (ref 6–8.2)
RBC # BLD AUTO: 5.49 M/UL (ref 4.2–5.4)
SODIUM SERPL-SCNC: 141 MMOL/L (ref 135–145)
TROPONIN T SERPL-MCNC: 10 NG/L (ref 6–19)
WBC # BLD AUTO: 10.5 K/UL (ref 4.8–10.8)

## 2022-03-24 PROCEDURE — 83605 ASSAY OF LACTIC ACID: CPT

## 2022-03-24 PROCEDURE — 85025 COMPLETE CBC W/AUTO DIFF WBC: CPT

## 2022-03-24 PROCEDURE — 93005 ELECTROCARDIOGRAM TRACING: CPT | Performed by: EMERGENCY MEDICINE

## 2022-03-24 PROCEDURE — 80053 COMPREHEN METABOLIC PANEL: CPT

## 2022-03-24 PROCEDURE — C9803 HOPD COVID-19 SPEC COLLECT: HCPCS | Performed by: EMERGENCY MEDICINE

## 2022-03-24 PROCEDURE — 770001 HCHG ROOM/CARE - MED/SURG/GYN PRIV*

## 2022-03-24 PROCEDURE — 87040 BLOOD CULTURE FOR BACTERIA: CPT

## 2022-03-24 PROCEDURE — 93005 ELECTROCARDIOGRAM TRACING: CPT

## 2022-03-24 PROCEDURE — 700102 HCHG RX REV CODE 250 W/ 637 OVERRIDE(OP): Performed by: EMERGENCY MEDICINE

## 2022-03-24 PROCEDURE — 83880 ASSAY OF NATRIURETIC PEPTIDE: CPT

## 2022-03-24 PROCEDURE — 99285 EMERGENCY DEPT VISIT HI MDM: CPT

## 2022-03-24 PROCEDURE — 94640 AIRWAY INHALATION TREATMENT: CPT

## 2022-03-24 PROCEDURE — 36415 COLL VENOUS BLD VENIPUNCTURE: CPT

## 2022-03-24 PROCEDURE — 71045 X-RAY EXAM CHEST 1 VIEW: CPT

## 2022-03-24 PROCEDURE — 84484 ASSAY OF TROPONIN QUANT: CPT

## 2022-03-24 PROCEDURE — 700101 HCHG RX REV CODE 250: Performed by: EMERGENCY MEDICINE

## 2022-03-24 PROCEDURE — 99223 1ST HOSP IP/OBS HIGH 75: CPT | Performed by: INTERNAL MEDICINE

## 2022-03-24 PROCEDURE — 700111 HCHG RX REV CODE 636 W/ 250 OVERRIDE (IP): Performed by: EMERGENCY MEDICINE

## 2022-03-24 PROCEDURE — 0241U HCHG SARS-COV-2 COVID-19 NFCT DS RESP RNA 4 TRGT MIC: CPT

## 2022-03-24 PROCEDURE — 96374 THER/PROPH/DIAG INJ IV PUSH: CPT

## 2022-03-24 PROCEDURE — 96375 TX/PRO/DX INJ NEW DRUG ADDON: CPT

## 2022-03-24 PROCEDURE — A9270 NON-COVERED ITEM OR SERVICE: HCPCS | Performed by: EMERGENCY MEDICINE

## 2022-03-24 RX ORDER — NICOTINE 21 MG/24HR
21 PATCH, TRANSDERMAL 24 HOURS TRANSDERMAL
Status: DISCONTINUED | OUTPATIENT
Start: 2022-03-25 | End: 2022-03-27 | Stop reason: HOSPADM

## 2022-03-24 RX ORDER — ALBUTEROL SULFATE 90 UG/1
2 AEROSOL, METERED RESPIRATORY (INHALATION) EVERY 6 HOURS PRN
Status: DISCONTINUED | OUTPATIENT
Start: 2022-03-24 | End: 2022-03-27 | Stop reason: HOSPADM

## 2022-03-24 RX ORDER — LABETALOL HYDROCHLORIDE 5 MG/ML
10 INJECTION, SOLUTION INTRAVENOUS ONCE
Status: COMPLETED | OUTPATIENT
Start: 2022-03-24 | End: 2022-03-24

## 2022-03-24 RX ORDER — METHYLPREDNISOLONE SODIUM SUCCINATE 125 MG/2ML
125 INJECTION, POWDER, LYOPHILIZED, FOR SOLUTION INTRAMUSCULAR; INTRAVENOUS ONCE
Status: COMPLETED | OUTPATIENT
Start: 2022-03-24 | End: 2022-03-24

## 2022-03-24 RX ORDER — ONDANSETRON 4 MG/1
4 TABLET, ORALLY DISINTEGRATING ORAL EVERY 4 HOURS PRN
Status: DISCONTINUED | OUTPATIENT
Start: 2022-03-24 | End: 2022-03-27 | Stop reason: HOSPADM

## 2022-03-24 RX ORDER — AMOXICILLIN 250 MG
2 CAPSULE ORAL 2 TIMES DAILY
Status: DISCONTINUED | OUTPATIENT
Start: 2022-03-24 | End: 2022-03-27 | Stop reason: HOSPADM

## 2022-03-24 RX ORDER — FUROSEMIDE 10 MG/ML
40 INJECTION INTRAMUSCULAR; INTRAVENOUS
Status: DISCONTINUED | OUTPATIENT
Start: 2022-03-24 | End: 2022-03-26

## 2022-03-24 RX ORDER — IPRATROPIUM BROMIDE AND ALBUTEROL SULFATE 2.5; .5 MG/3ML; MG/3ML
3 SOLUTION RESPIRATORY (INHALATION)
Status: COMPLETED | OUTPATIENT
Start: 2022-03-24 | End: 2022-03-24

## 2022-03-24 RX ORDER — AMLODIPINE BESYLATE 5 MG/1
10 TABLET ORAL DAILY
Status: DISCONTINUED | OUTPATIENT
Start: 2022-03-24 | End: 2022-03-27 | Stop reason: HOSPADM

## 2022-03-24 RX ORDER — POLYETHYLENE GLYCOL 3350 17 G/17G
1 POWDER, FOR SOLUTION ORAL
Status: DISCONTINUED | OUTPATIENT
Start: 2022-03-24 | End: 2022-03-27 | Stop reason: HOSPADM

## 2022-03-24 RX ORDER — GUAIFENESIN/DEXTROMETHORPHAN 100-10MG/5
10 SYRUP ORAL EVERY 6 HOURS PRN
Status: DISCONTINUED | OUTPATIENT
Start: 2022-03-24 | End: 2022-03-27 | Stop reason: HOSPADM

## 2022-03-24 RX ORDER — AZITHROMYCIN 250 MG/1
500 TABLET, FILM COATED ORAL DAILY
Status: COMPLETED | OUTPATIENT
Start: 2022-03-25 | End: 2022-03-27

## 2022-03-24 RX ORDER — IPRATROPIUM BROMIDE AND ALBUTEROL SULFATE 2.5; .5 MG/3ML; MG/3ML
3 SOLUTION RESPIRATORY (INHALATION)
Status: DISCONTINUED | OUTPATIENT
Start: 2022-03-24 | End: 2022-03-27 | Stop reason: HOSPADM

## 2022-03-24 RX ORDER — DOXYCYCLINE 100 MG/1
100 TABLET ORAL ONCE
Status: COMPLETED | OUTPATIENT
Start: 2022-03-24 | End: 2022-03-24

## 2022-03-24 RX ORDER — OXYCODONE HYDROCHLORIDE 5 MG/1
5 TABLET ORAL
Status: DISCONTINUED | OUTPATIENT
Start: 2022-03-24 | End: 2022-03-27 | Stop reason: HOSPADM

## 2022-03-24 RX ORDER — HYDRALAZINE HYDROCHLORIDE 20 MG/ML
10 INJECTION INTRAMUSCULAR; INTRAVENOUS EVERY 4 HOURS PRN
Status: DISCONTINUED | OUTPATIENT
Start: 2022-03-24 | End: 2022-03-27 | Stop reason: HOSPADM

## 2022-03-24 RX ORDER — HYDROMORPHONE HYDROCHLORIDE 1 MG/ML
0.25 INJECTION, SOLUTION INTRAMUSCULAR; INTRAVENOUS; SUBCUTANEOUS
Status: DISCONTINUED | OUTPATIENT
Start: 2022-03-24 | End: 2022-03-27 | Stop reason: HOSPADM

## 2022-03-24 RX ORDER — ACETAMINOPHEN 325 MG/1
650 TABLET ORAL EVERY 6 HOURS PRN
Status: DISCONTINUED | OUTPATIENT
Start: 2022-03-24 | End: 2022-03-27 | Stop reason: HOSPADM

## 2022-03-24 RX ORDER — IPRATROPIUM BROMIDE AND ALBUTEROL SULFATE 2.5; .5 MG/3ML; MG/3ML
3 SOLUTION RESPIRATORY (INHALATION)
Status: DISPENSED | OUTPATIENT
Start: 2022-03-24 | End: 2022-03-25

## 2022-03-24 RX ORDER — PREDNISONE 20 MG/1
40 TABLET ORAL DAILY
Status: DISCONTINUED | OUTPATIENT
Start: 2022-03-25 | End: 2022-03-27 | Stop reason: HOSPADM

## 2022-03-24 RX ORDER — ONDANSETRON 2 MG/ML
4 INJECTION INTRAMUSCULAR; INTRAVENOUS EVERY 4 HOURS PRN
Status: DISCONTINUED | OUTPATIENT
Start: 2022-03-24 | End: 2022-03-27 | Stop reason: HOSPADM

## 2022-03-24 RX ORDER — OXYCODONE HYDROCHLORIDE 5 MG/1
2.5 TABLET ORAL
Status: DISCONTINUED | OUTPATIENT
Start: 2022-03-24 | End: 2022-03-27 | Stop reason: HOSPADM

## 2022-03-24 RX ORDER — BISACODYL 10 MG
10 SUPPOSITORY, RECTAL RECTAL
Status: DISCONTINUED | OUTPATIENT
Start: 2022-03-24 | End: 2022-03-27 | Stop reason: HOSPADM

## 2022-03-24 RX ADMIN — METHYLPREDNISOLONE SODIUM SUCCINATE 125 MG: 125 INJECTION, POWDER, FOR SOLUTION INTRAMUSCULAR; INTRAVENOUS at 22:50

## 2022-03-24 RX ADMIN — IPRATROPIUM BROMIDE AND ALBUTEROL SULFATE 3 ML: .5; 2.5 SOLUTION RESPIRATORY (INHALATION) at 22:51

## 2022-03-24 RX ADMIN — LABETALOL HYDROCHLORIDE 10 MG: 5 INJECTION INTRAVENOUS at 22:50

## 2022-03-24 RX ADMIN — DOXYCYCLINE 100 MG: 100 TABLET, FILM COATED ORAL at 23:09

## 2022-03-24 ASSESSMENT — ENCOUNTER SYMPTOMS
HEMOPTYSIS: 0
HEADACHES: 0
SHORTNESS OF BREATH: 1
NERVOUS/ANXIOUS: 0
NAUSEA: 0
CHILLS: 0
DOUBLE VISION: 0
NECK PAIN: 0
POLYDIPSIA: 0
PHOTOPHOBIA: 0
SPEECH CHANGE: 0
VOMITING: 0
COUGH: 1
FLANK PAIN: 0
PALPITATIONS: 0
HALLUCINATIONS: 0
BRUISES/BLEEDS EASILY: 0
BLURRED VISION: 0
WEIGHT LOSS: 0
FEVER: 0
BACK PAIN: 0
HEARTBURN: 0
SPUTUM PRODUCTION: 1
FOCAL WEAKNESS: 0
WHEEZING: 1
TREMORS: 0
ORTHOPNEA: 0

## 2022-03-24 ASSESSMENT — LIFESTYLE VARIABLES: SUBSTANCE_ABUSE: 0

## 2022-03-24 ASSESSMENT — COPD QUESTIONNAIRES
HAVE YOU SMOKED AT LEAST 100 CIGARETTES IN YOUR ENTIRE LIFE: YES
DURING THE PAST 4 WEEKS HOW MUCH DID YOU FEEL SHORT OF BREATH: NONE/LITTLE OF THE TIME
COPD SCREENING SCORE: 5
DO YOU EVER COUGH UP ANY MUCUS OR PHLEGM?: YES, A FEW DAYS A WEEK OR MONTH

## 2022-03-24 ASSESSMENT — FIBROSIS 4 INDEX: FIB4 SCORE: 0.88

## 2022-03-25 ENCOUNTER — APPOINTMENT (OUTPATIENT)
Dept: CARDIOLOGY | Facility: MEDICAL CENTER | Age: 67
DRG: 190 | End: 2022-03-25
Attending: INTERNAL MEDICINE

## 2022-03-25 LAB
ALBUMIN SERPL BCP-MCNC: 4.3 G/DL (ref 3.2–4.9)
ALBUMIN/GLOB SERPL: 1.4 G/DL
ALP SERPL-CCNC: 64 U/L (ref 30–99)
ALT SERPL-CCNC: 22 U/L (ref 2–50)
ANION GAP SERPL CALC-SCNC: 11 MMOL/L (ref 7–16)
APPEARANCE UR: CLEAR
AST SERPL-CCNC: 20 U/L (ref 12–45)
BASOPHILS # BLD AUTO: 0.9 % (ref 0–1.8)
BASOPHILS # BLD: 0.1 K/UL (ref 0–0.12)
BILIRUB SERPL-MCNC: 0.4 MG/DL (ref 0.1–1.5)
BILIRUB UR QL STRIP.AUTO: NEGATIVE
BUN SERPL-MCNC: 14 MG/DL (ref 8–22)
CALCIUM SERPL-MCNC: 9 MG/DL (ref 8.5–10.5)
CHLORIDE SERPL-SCNC: 102 MMOL/L (ref 96–112)
CO2 SERPL-SCNC: 26 MMOL/L (ref 20–33)
COLOR UR: YELLOW
CREAT SERPL-MCNC: 0.5 MG/DL (ref 0.5–1.4)
EOSINOPHIL # BLD AUTO: 0 K/UL (ref 0–0.51)
EOSINOPHIL NFR BLD: 0 % (ref 0–6.9)
ERYTHROCYTE [DISTWIDTH] IN BLOOD BY AUTOMATED COUNT: 46.1 FL (ref 35.9–50)
FLUAV RNA SPEC QL NAA+PROBE: NEGATIVE
FLUBV RNA SPEC QL NAA+PROBE: NEGATIVE
GFR SERPLBLD CREATININE-BSD FMLA CKD-EPI: 103 ML/MIN/1.73 M 2
GLOBULIN SER CALC-MCNC: 3 G/DL (ref 1.9–3.5)
GLUCOSE SERPL-MCNC: 145 MG/DL (ref 65–99)
GLUCOSE UR STRIP.AUTO-MCNC: NEGATIVE MG/DL
HCT VFR BLD AUTO: 54.4 % (ref 37–47)
HGB BLD-MCNC: 17.3 G/DL (ref 12–16)
KETONES UR STRIP.AUTO-MCNC: NEGATIVE MG/DL
LEUKOCYTE ESTERASE UR QL STRIP.AUTO: NEGATIVE
LV EJECT FRACT  99904: 75
LV EJECT FRACT MOD 2C 99903: 85.74
LV EJECT FRACT MOD 4C 99902: 74.05
LV EJECT FRACT MOD BP 99901: 80.66
LYMPHOCYTES # BLD AUTO: 1.1 K/UL (ref 1–4.8)
LYMPHOCYTES NFR BLD: 10.4 % (ref 22–41)
MANUAL DIFF BLD: NORMAL
MCH RBC QN AUTO: 30.7 PG (ref 27–33)
MCHC RBC AUTO-ENTMCNC: 31.8 G/DL (ref 33.6–35)
MCV RBC AUTO: 96.6 FL (ref 81.4–97.8)
MICRO URNS: NORMAL
MONOCYTES # BLD AUTO: 0.55 K/UL (ref 0–0.85)
MONOCYTES NFR BLD AUTO: 5.2 % (ref 0–13.4)
MORPHOLOGY BLD-IMP: NORMAL
NEUTROPHILS # BLD AUTO: 8.85 K/UL (ref 2–7.15)
NEUTROPHILS NFR BLD: 83.5 % (ref 44–72)
NITRITE UR QL STRIP.AUTO: NEGATIVE
NRBC # BLD AUTO: 0 K/UL
NRBC BLD-RTO: 0 /100 WBC
PH UR STRIP.AUTO: 5.5 [PH] (ref 5–8)
PLATELET # BLD AUTO: 220 K/UL (ref 164–446)
PLATELET BLD QL SMEAR: NORMAL
PMV BLD AUTO: 10.1 FL (ref 9–12.9)
POTASSIUM SERPL-SCNC: 4.5 MMOL/L (ref 3.6–5.5)
PROT SERPL-MCNC: 7.3 G/DL (ref 6–8.2)
PROT UR QL STRIP: NEGATIVE MG/DL
RBC # BLD AUTO: 5.63 M/UL (ref 4.2–5.4)
RBC BLD AUTO: PRESENT
RBC UR QL AUTO: NEGATIVE
RSV RNA SPEC QL NAA+PROBE: NEGATIVE
SARS-COV-2 RNA RESP QL NAA+PROBE: NOTDETECTED
SODIUM SERPL-SCNC: 139 MMOL/L (ref 135–145)
SP GR UR STRIP.AUTO: 1.02
SPECIMEN SOURCE: NORMAL
UROBILINOGEN UR STRIP.AUTO-MCNC: 0.2 MG/DL
VARIANT LYMPHS BLD QL SMEAR: NORMAL
WBC # BLD AUTO: 10.6 K/UL (ref 4.8–10.8)

## 2022-03-25 PROCEDURE — 99253 IP/OBS CNSLTJ NEW/EST LOW 45: CPT | Performed by: STUDENT IN AN ORGANIZED HEALTH CARE EDUCATION/TRAINING PROGRAM

## 2022-03-25 PROCEDURE — 80053 COMPREHEN METABOLIC PANEL: CPT

## 2022-03-25 PROCEDURE — A9270 NON-COVERED ITEM OR SERVICE: HCPCS | Performed by: INTERNAL MEDICINE

## 2022-03-25 PROCEDURE — 94664 DEMO&/EVAL PT USE INHALER: CPT

## 2022-03-25 PROCEDURE — 93306 TTE W/DOPPLER COMPLETE: CPT

## 2022-03-25 PROCEDURE — 99406 BEHAV CHNG SMOKING 3-10 MIN: CPT

## 2022-03-25 PROCEDURE — 96375 TX/PRO/DX INJ NEW DRUG ADDON: CPT

## 2022-03-25 PROCEDURE — 700101 HCHG RX REV CODE 250: Performed by: INTERNAL MEDICINE

## 2022-03-25 PROCEDURE — 96376 TX/PRO/DX INJ SAME DRUG ADON: CPT

## 2022-03-25 PROCEDURE — 99232 SBSQ HOSP IP/OBS MODERATE 35: CPT | Performed by: STUDENT IN AN ORGANIZED HEALTH CARE EDUCATION/TRAINING PROGRAM

## 2022-03-25 PROCEDURE — 36415 COLL VENOUS BLD VENIPUNCTURE: CPT

## 2022-03-25 PROCEDURE — 81003 URINALYSIS AUTO W/O SCOPE: CPT

## 2022-03-25 PROCEDURE — 700102 HCHG RX REV CODE 250 W/ 637 OVERRIDE(OP): Performed by: INTERNAL MEDICINE

## 2022-03-25 PROCEDURE — 93306 TTE W/DOPPLER COMPLETE: CPT | Mod: 26 | Performed by: INTERNAL MEDICINE

## 2022-03-25 PROCEDURE — 85027 COMPLETE CBC AUTOMATED: CPT

## 2022-03-25 PROCEDURE — 94669 MECHANICAL CHEST WALL OSCILL: CPT

## 2022-03-25 PROCEDURE — 700117 HCHG RX CONTRAST REV CODE 255: Performed by: INTERNAL MEDICINE

## 2022-03-25 PROCEDURE — 700111 HCHG RX REV CODE 636 W/ 250 OVERRIDE (IP): Performed by: INTERNAL MEDICINE

## 2022-03-25 PROCEDURE — 96372 THER/PROPH/DIAG INJ SC/IM: CPT

## 2022-03-25 PROCEDURE — 94640 AIRWAY INHALATION TREATMENT: CPT

## 2022-03-25 PROCEDURE — 87086 URINE CULTURE/COLONY COUNT: CPT

## 2022-03-25 PROCEDURE — 770001 HCHG ROOM/CARE - MED/SURG/GYN PRIV*

## 2022-03-25 PROCEDURE — 85007 BL SMEAR W/DIFF WBC COUNT: CPT

## 2022-03-25 PROCEDURE — 700101 HCHG RX REV CODE 250: Performed by: STUDENT IN AN ORGANIZED HEALTH CARE EDUCATION/TRAINING PROGRAM

## 2022-03-25 PROCEDURE — 94760 N-INVAS EAR/PLS OXIMETRY 1: CPT

## 2022-03-25 RX ORDER — IPRATROPIUM BROMIDE AND ALBUTEROL SULFATE 2.5; .5 MG/3ML; MG/3ML
3 SOLUTION RESPIRATORY (INHALATION)
Status: DISCONTINUED | OUTPATIENT
Start: 2022-03-25 | End: 2022-03-25

## 2022-03-25 RX ORDER — IPRATROPIUM BROMIDE AND ALBUTEROL SULFATE 2.5; .5 MG/3ML; MG/3ML
3 SOLUTION RESPIRATORY (INHALATION)
Status: DISCONTINUED | OUTPATIENT
Start: 2022-03-26 | End: 2022-03-27

## 2022-03-25 RX ADMIN — IPRATROPIUM BROMIDE AND ALBUTEROL SULFATE 3 ML: .5; 3 SOLUTION RESPIRATORY (INHALATION) at 15:22

## 2022-03-25 RX ADMIN — HUMAN ALBUMIN MICROSPHERES AND PERFLUTREN 3 ML: 10; .22 INJECTION, SOLUTION INTRAVENOUS at 09:29

## 2022-03-25 RX ADMIN — IPRATROPIUM BROMIDE AND ALBUTEROL SULFATE 3 ML: 2.5; .5 SOLUTION RESPIRATORY (INHALATION) at 21:59

## 2022-03-25 RX ADMIN — ENOXAPARIN SODIUM 40 MG: 40 INJECTION SUBCUTANEOUS at 06:30

## 2022-03-25 RX ADMIN — NICOTINE TRANSDERMAL SYSTEM 21 MG: 21 PATCH, EXTENDED RELEASE TRANSDERMAL at 06:30

## 2022-03-25 RX ADMIN — PREDNISONE 40 MG: 20 TABLET ORAL at 06:29

## 2022-03-25 RX ADMIN — FUROSEMIDE 40 MG: 10 INJECTION, SOLUTION INTRAMUSCULAR; INTRAVENOUS at 00:03

## 2022-03-25 RX ADMIN — FUROSEMIDE 40 MG: 10 INJECTION, SOLUTION INTRAMUSCULAR; INTRAVENOUS at 14:55

## 2022-03-25 RX ADMIN — FUROSEMIDE 40 MG: 10 INJECTION, SOLUTION INTRAMUSCULAR; INTRAVENOUS at 06:30

## 2022-03-25 RX ADMIN — AMLODIPINE BESYLATE 10 MG: 5 TABLET ORAL at 00:02

## 2022-03-25 RX ADMIN — IPRATROPIUM BROMIDE AND ALBUTEROL SULFATE 3 ML: 2.5; .5 SOLUTION RESPIRATORY (INHALATION) at 09:45

## 2022-03-25 RX ADMIN — AZITHROMYCIN 500 MG: 250 TABLET, FILM COATED ORAL at 06:29

## 2022-03-25 RX ADMIN — IPRATROPIUM BROMIDE AND ALBUTEROL SULFATE 3 ML: .5; 2.5 SOLUTION RESPIRATORY (INHALATION) at 06:22

## 2022-03-25 ASSESSMENT — PATIENT HEALTH QUESTIONNAIRE - PHQ9
SUM OF ALL RESPONSES TO PHQ9 QUESTIONS 1 AND 2: 0
2. FEELING DOWN, DEPRESSED, IRRITABLE, OR HOPELESS: NOT AT ALL
1. LITTLE INTEREST OR PLEASURE IN DOING THINGS: NOT AT ALL

## 2022-03-25 ASSESSMENT — ENCOUNTER SYMPTOMS
COUGH: 1
WHEEZING: 1
SPUTUM PRODUCTION: 1
SHORTNESS OF BREATH: 1
NAUSEA: 0
CHILLS: 0
HEARTBURN: 0
FEVER: 0
VOMITING: 0

## 2022-03-25 ASSESSMENT — PAIN DESCRIPTION - PAIN TYPE: TYPE: ACUTE PAIN

## 2022-03-25 NOTE — PROGRESS NOTES
4 Eyes Skin Assessment Completed by LOREE Borden and LOREE Hilario.    Head Blanching and Redness  Ears WDL  Nose WDL  Mouth WDL  Neck WDL  Breast/Chest Scar  Shoulder Blades WDL  Spine scar  (R) Arm/Elbow/Hand Bruising  (L) Arm/Elbow/Hand Bruising  Abdomen Scar  Groin WDL  Scrotum/Coccyx/Buttocks WDL  (R) Leg Swelling  (L) Leg Swelling  (R) Heel/Foot/Toe WDL  (L) Heel/Foot/Toe WDL          Devices In Places Blood Pressure Cuff, Pulse Ox and Nasal Cannula      Interventions In Place Gray Ear Foams, Pillows and Pressure Redistribution Mattress    Possible Skin Injury No    Pictures Uploaded Into Epic N/A  Wound Consult Placed N/A  RN Wound Prevention Protocol Ordered No

## 2022-03-25 NOTE — ASSESSMENT & PLAN NOTE
50+ pack years, with wheezing and green tinged sputum. Likely due to medication nonaccess as well as ongoing smoking.  Smoking cessation counseling 5 mins, patient currently has tapered down already  -Agree with treatment for exacerbation  -Discharge on LAMA and rescue inhaler when stable  -Please place pulmonary referral for follow-up and PFT's

## 2022-03-25 NOTE — ASSESSMENT & PLAN NOTE
Multifactorial due to hfpef, COPD exacerbation.   -Diuretics per primary  -Cont. 5 days steroids, azithro course, duonebs for exacerbation  -Discharge on maintenance LAMA and albuterol HFA for rescue  -Please order HRCT for evaluation of lung parenchyma either inpatient or outpatient

## 2022-03-25 NOTE — ED NOTES
Pt family member updated per pt request Echo at bedside call light wtihin reach bed low and locked

## 2022-03-25 NOTE — ASSESSMENT & PLAN NOTE
Secondary to COPD exacerbation, with possible superimposed flash pulmonary edema  Baseline room air  Bilateral wheezes on exam.  Chest x-ray showed diffuse interstitial prominence  Plan: Treatment as above with steroids, antibiotics, bronchodilators, Lasix  COVID-19/RSV/influenza rule out  Transthoracic echo  Wean off oxygen as able    3/26 on 3L O2  On lasix  S/p abx  Monitor  On steroids

## 2022-03-25 NOTE — RESPIRATORY CARE
"COPD EDUCATION by COPD CLINICAL EDUCATOR  3/25/2022  at  1:45 PM by Angelica Mittal, RRT     Patient interviewed by COPD education team.  Patient unable to participate in full program.  A short intervention has been conducted.  A comprehensive packet including information about COPD, types of treatments to manage their disease and safe home Oxygen usage was provided and reviewed with patient at the bedside. Her family joined us in discussion.  All materials provided in Slovak. 7 minutes spent on smoking cessation education with patient and family.  Provided smoking cessation packet with \"Tips to Quit\" and brochure for \"Free Virtual Smoking Cessation Classes\". (no Data phone for RPM)  COPD Screen  COPD Risk Screening  Do you have a history of COPD?: Yes  Do you have a Pulmonologist?: No  COPD Population Screener  During the past 4 weeks, how much did you feel short of breath?: None/Little of the time  Do you ever cough up any mucus or phlegm?: Yes, a few days a week or month  In the past 12 months, you do less than you used to because of your breathing problems: Disagree/unsure  Have you smoked at least 100 cigarettes in your entire life?: Yes  How old are you?: 60+  COPD Screening Score: 5    COPD Assessment  COPD Clinical Specialists ONLY  COPD Education Initiated: Yes--Short Intervention (Pt states she is uninsured and does not have a PCP. Family at bedside. Provided info on clinics accepting sliding scale. IP Pulmonar to see Smoker review)  DME Company: none  Physician Name: provided information [facility] and [facility]  Pulmonologist Name: declined she has no money to pay for it  Referrals Initiated: Yes  Pulmonary Rehab: Yes (-0- PFT but sent by admitting team)  Smoking Cessation: Yes  $ Smoking Cessation 3-10 Minutes: Symptomatic  Hospice: N/A  Home Health Care: N/A  [facility]: N/A  [facility]: N/A  [facility] (refused referral but provided flyer to consider)  Is this " "a COPD exacerbation patient?: Yes  $ Demo/Eval of SVN's, MDI's and Aerosols: Yes (rescue -spacer instruction)  (OP) Pulmonary Function Testing:  (none) declined bedside screen    Meds to Beds  Would the patient like to opt in for Bedside Medication Delivery at Discharge?: Yes, interested     MY COPD ACTION PLAN     It is recommended that patients and physicians /healthcare providers complete this action plan together. This plan should be discussed at each physician visit and updated as needed.    The green, yellow and red zones show groups of symptoms of COPD. This list of symptoms is not comprehensive, and you may experience other symptoms. In the \"Actions\" column, your healthcare provider has recommended actions for you to take based on your symptoms.    Patient Name: Radha Guerin   YOB: 1955   Last Updated on:     Green Zone:  I am doing well today Actions   •  Usual activitiy and exercise level •  Take daily medications   •  Usual amounts of cough and phlegm/mucus •  Use oxygen as prescribed   •  Sleep well at night •  Continue regular exercise/diet plan   •  Appetite is good •  At all times avoid cigarette smoke, inhaled irritants     Daily Medications (these medications are taken every day):                Yellow Zone:  I am having a bad day or a COPD flare Actions   •  More breathless than usual •  Continue daily medications   •  I have less energy for my daily activities •  Use quick relief inhaler as ordered   •  Increased or thicker phlegm/mucus •  Use oxygen as prescribed   •  Using quick relief inhaler/nebulizer more often •  Get plenty of rest   •  Swelling of ankles more than usual •  Use pursed lip breathing   •  More coughing than usual •  At all times avoid cigarette smoke, inhaled irritants   •  I feel like I have a \"chest cold\"   •  Poor sleep and my symptoms woke me up   •  My appetite is not good   •  My medicine is not helping    •  Call provider immediately if symptoms don’t " improve     Continue daily medications, add rescue medications:   Albuterol 2 Puffs         Medications to be used during a flare up, (as Discussed with Provider):           Additional Information:  Use spacer and use as directed by your Doctor Team    Red Zone:  I need urgent medical care Actions   •  Severe shortness of breath even at rest •  Call 911 or seek medical care immediately   •  Not able to do any activity because of breathing    •  Fever or shaking chills    •  Feeling confused or very drowsy     •  Chest pains    •  Coughing up blood

## 2022-03-25 NOTE — ASSESSMENT & PLAN NOTE
Patient received IV Solu-Medrol and doxycycline in ER  We will continue with prednisone, azithromycin, DuoNeb, Robitussin  RT protocol, supplemental oxygen, wean down as tolerated  Pulmonology consulted

## 2022-03-25 NOTE — CONSULTS
Pulmonary Consult Note    Date of consult: 3/25/2022  Resident: Lit Montoya M.D.  Attending:  Dr. Dominguez    Referring Physician  DEE PhillipsO.    Reason for Consultation  Shortness of Breath (For around one week. Patient 85% on RA in triage. Placed on 3L O2)      History of Present Illness  Radha Guerin is a 66 y.o. female with a PMHx significant for COPD (no available PFT's), current smoker, who presented on 3/24/2022 with worsening shortness of breath and cough productive of green sputum, admitted for acute hypoxic respiratory failure and hypertensive urgency.    She is not on oxygen at home. She describes a 1 week period of worsening symptoms starting with congestion which lead to cough and sputum production and wheezing. Normally she is very active and cleans houses/businesses for work but now she is short of breath and needing O2 to maintain sats. 50+ pack year smoking history, currently approx 1 ppd. She does not currently have a PCP and therefore is not using any maintenace inhalers, however when she needs it she occasionally borrows a relative's asthma rescue inhaler.    Code Status  full    Past Medical History   has a past medical history of Chronic obstructive pulmonary disease (HCC) and Hypertension.    Past Surgical History   has no past surgical history on file.    Medications  Home Medications     Reviewed by Bernice Godinez, Pharmacy Intern (Pharmacy Intern) on 03/24/22 at 2246  Med List Status: Complete   Medication Last Dose Status   albuterol 108 (90 BASE) MCG/ACT Aero Soln inhalation aerosol 3/24/2022 Active   amLODIPine (NORVASC) 10 MG Tab ~1 WEEK Active   guaiFENesin (ROBITUSSIN) 100 MG/5ML Solution 3/24/2022 Active                Allergies  No Known Allergies    Social History   reports that she has been smoking. She has been smoking about 1.00 pack per day. She does not have any smokeless tobacco history on file. She reports that she does not drink alcohol and does not use drugs.      Family History  family history is not on file.    Review of Systems  Review of Systems   Constitutional: Negative for chills and fever.   Respiratory: Positive for cough, sputum production, shortness of breath and wheezing.    Cardiovascular: Positive for chest pain and leg swelling.   Gastrointestinal: Negative for heartburn, nausea and vomiting.       Vital Signs last 24 hours  Temp:  [36.3 °C (97.3 °F)-36.8 °C (98.2 °F)] 36.3 °C (97.3 °F)  Pulse:  [] 79  Resp:  [15-27] 18  BP: (110-241)/() 134/71  SpO2:  [82 %-96 %] 90 %  O2 therapy: Pulse Oximetry: 90 %, O2 (LPM): 3, O2 Delivery Device: Nasal Cannula    Fluids    Intake/Output Summary (Last 24 hours) at 3/25/2022 1228  Last data filed at 3/25/2022 1101  Gross per 24 hour   Intake 240 ml   Output --   Net 240 ml       Physical Exam  Physical Exam  Constitutional:       General: She is not in acute distress.  HENT:      Head: Normocephalic and atraumatic.      Right Ear: External ear normal.      Left Ear: External ear normal.      Nose: Congestion present.      Mouth/Throat:      Mouth: Mucous membranes are moist.      Pharynx: Oropharynx is clear.   Eyes:      Extraocular Movements: Extraocular movements intact.      Pupils: Pupils are equal, round, and reactive to light.   Cardiovascular:      Rate and Rhythm: Normal rate and regular rhythm.      Heart sounds: No murmur heard.  Pulmonary:      Effort: Pulmonary effort is normal. No respiratory distress.      Breath sounds: Wheezing and rhonchi present. No rales.   Abdominal:      General: Abdomen is flat.      Palpations: Abdomen is soft.   Musculoskeletal:         General: Swelling present.      Right lower leg: Edema present.   Skin:     General: Skin is warm and dry.      Capillary Refill: Capillary refill takes less than 2 seconds.   Neurological:      General: No focal deficit present.      Mental Status: She is alert and oriented to person, place, and time. Mental status is at baseline.          Laboratory  Recent Labs     03/24/22 1842 03/25/22  0311   WBC 10.5 10.6   NEUTSPOLYS 66.20 83.50*   LYMPHOCYTES 21.00* 10.40*   MONOCYTES 10.70 5.20   EOSINOPHILS 1.30 0.00   BASOPHILS 0.50 0.90   ASTSGOT 16 20   ALTSGPT 25 22   ALKPHOSPHAT 60 64   TBILIRUBIN 0.2 0.4     Recent Labs     03/24/22 1842 03/25/22  0311   SODIUM 141 139   POTASSIUM 4.5 4.5   CHLORIDE 104 102   CO2 25 26   BUN 14 14   CREATININE 0.46* 0.50   CALCIUM 9.2 9.0     Recent Labs     03/24/22 1842 03/25/22  0311   ALTSGPT 25 22   ASTSGOT 16 20   ALKPHOSPHAT 60 64   TBILIRUBIN 0.2 0.4   GLUCOSE 94 145*           Imaging  EC-ECHOCARDIOGRAM COMPLETE W/ CONT   Final Result      DX-CHEST-PORTABLE (1 VIEW)   Final Result         Diffuse interstitial prominence could relate to fluid overload or atypical infection.          Problem List  Principal Problem:    COPD exacerbation (HCC) POA: Yes  Active Problems:    Acute respiratory failure with hypoxia (HCC) POA: Unknown    Tobacco abuse POA: Yes    Hypertensive urgency POA: Unknown  Resolved Problems:    * No resolved hospital problems. *      Assessment and Plan  * COPD exacerbation (HCC)- (present on admission)  Assessment & Plan  50+ pack years, with wheezing and green tinged sputum. Likely due to medication nonaccess as well as ongoing smoking.  Smoking cessation counseling 5 mins, patient currently has tapered down already  -Agree with treatment for exacerbation  -Discharge on LAMA and rescue inhaler when stable  -Please place pulmonary referral for follow-up and PFT's    Acute respiratory failure with hypoxia (HCC)  Assessment & Plan  Multifactorial due to hfpef, COPD exacerbation.   -Diuretics per primary  -Cont. 5 days steroids, azithro course, duonebs for exacerbation  -Discharge on maintenance LAMA and albuterol HFA for rescue  -Please order HRCT for evaluation of lung parenchyma either inpatient or outpatient

## 2022-03-25 NOTE — CARE PLAN
"The patient is Stable - Low risk of patient condition declining or worsening    Shift Goals  Clinical Goals: maintain O2 saturation  Patient Goals: \"eat and shower\"  Family Goals: STEFANIA    Progress made toward(s) clinical / shift goals:  Pt O2 sat maintained with 3L of O2 via NC. Pt remains up to chair most of the afternoon.      Patient is not progressing towards the following goals:      Problem: Knowledge Deficit - COPD  Goal: Patient/significant other demonstrates understanding of disease process, utilization of the Action Plan, medications and discharge instruction  Outcome: Not Progressing   Smoking cessation discussed with pt however pt expresses no drive to quit smoking.  "

## 2022-03-25 NOTE — ED NOTES
Patient reports she has been taking her son's inhaler without relief. Patient states she has a prescription for medication for hypertension but she has not picked it up. Patient has not taken it for the last week.

## 2022-03-25 NOTE — ED TRIAGE NOTES
Chief Complaint   Patient presents with   • Shortness of Breath     For around one week. Patient 85% on RA in triage. Placed on 3L O2       65 yo female to triage for above complaint. Patient reports cough and SOB x1week, worsening the last few days.   Sepsis score 3. Protocol ordered. EKG complete.  Patient on 3L O2 at this time. HX of COPD, but states she does not wear oxygen at home.    Pt is alert and oriented, speaking in full sentences, follows commands and responds appropriately to questions.     Patient placed back in lobby and educated on triage process. Asked to inform RN of any changes.    BP (!) 197/115   Pulse 97   Temp 36.8 °C (98.2 °F) (Temporal)   Resp 16   SpO2 92% Comment: 86 RA

## 2022-03-25 NOTE — PROGRESS NOTES
Hospital Medicine Daily Progress Note    Date of Service  3/25/2022    Chief Complaint  Radha Guerin is a 66 y.o. female admitted 3/24/2022 with COPD exacerbation    Hospital Course  Radha Guerin is a 66 y.o. female with reported history of COPD not on oxygen, hypertension, smoking 1 pack a day who presented 3/24/2022 with complaints of shortness of breath, cough.  Symptoms started 1 week ago as a cold symptoms with runny nose, chills.  In the last 3 days she is experiencing worsening of dyspnea exertion, some orthopnea, frequent cough with green sputum.  She reports intermittent lower extremity edema on and off in the last year.  In ER she noted to desaturate to 88% on room air, was placed on 3 L nasal cannula.  Blood pressure was 230/103, treated with IV labetalol.  Apparently, patient ran out of her amlodipine.  Chest x-ray showed bilateral interstitial prominence, pneumonitis versus edema.  COVID-19 rule out pending    Interval Problem Update  Patient on 4 L.  Duo nebs and steroids.  Pulmonology consulted.  Vitals are stable.  She is on room air at baseline.    I have personally seen and examined the patient at bedside. I discussed the plan of care with patient and bedside RN.    Consultants/Specialty  pulmonary    Code Status  Full Code    Disposition  Patient is not medically cleared for discharge.   Anticipate discharge to to home with close outpatient follow-up.  I have placed the appropriate orders for post-discharge needs.    Review of Systems  ROS     Physical Exam  Temp:  [36.3 °C (97.3 °F)-36.8 °C (98.2 °F)] 36.3 °C (97.3 °F)  Pulse:  [] 79  Resp:  [15-27] 18  BP: (110-241)/() 134/71  SpO2:  [82 %-96 %] 90 %    Physical Exam  Vitals and nursing note reviewed.   Constitutional:       General: She is not in acute distress.     Appearance: Normal appearance.   HENT:      Head: Normocephalic and atraumatic.   Eyes:      General: No scleral icterus.        Right eye: No discharge.          Left eye: No discharge.   Cardiovascular:      Rate and Rhythm: Normal rate and regular rhythm.      Heart sounds: Normal heart sounds.   Pulmonary:      Effort: Prolonged expiration present. No accessory muscle usage or respiratory distress.      Breath sounds: Wheezing present.      Comments: On 4L NC  Abdominal:      General: Abdomen is flat. There is no distension.      Tenderness: There is no abdominal tenderness. There is no guarding.   Musculoskeletal:         General: No tenderness.      Right lower leg: No edema.      Left lower leg: No edema.   Skin:     General: Skin is warm and dry.      Coloration: Skin is not jaundiced.   Neurological:      General: No focal deficit present.      Mental Status: She is alert and oriented to person, place, and time.      Cranial Nerves: No cranial nerve deficit.   Psychiatric:         Mood and Affect: Mood normal.         Behavior: Behavior normal.         Thought Content: Thought content normal.         Fluids    Intake/Output Summary (Last 24 hours) at 3/25/2022 1323  Last data filed at 3/25/2022 1101  Gross per 24 hour   Intake 240 ml   Output --   Net 240 ml       Laboratory  Recent Labs     03/24/22  1842 03/25/22  0311   WBC 10.5 10.6   RBC 5.49* 5.63*   HEMOGLOBIN 16.9* 17.3*   HEMATOCRIT 51.4* 54.4*   MCV 93.6 96.6   MCH 30.8 30.7   MCHC 32.9* 31.8*   RDW 45.1 46.1   PLATELETCT 239 220   MPV 10.1 10.1     Recent Labs     03/24/22  1842 03/25/22  0311   SODIUM 141 139   POTASSIUM 4.5 4.5   CHLORIDE 104 102   CO2 25 26   GLUCOSE 94 145*   BUN 14 14   CREATININE 0.46* 0.50   CALCIUM 9.2 9.0                   Imaging  EC-ECHOCARDIOGRAM COMPLETE W/ CONT   Final Result      DX-CHEST-PORTABLE (1 VIEW)   Final Result         Diffuse interstitial prominence could relate to fluid overload or atypical infection.           Assessment/Plan  * COPD exacerbation (HCC)- (present on admission)  Assessment & Plan  Patient received IV Solu-Medrol and doxycycline in ER  We will  continue with prednisone, azithromycin, DuoNeb, Robitussin  RT protocol, supplemental oxygen, wean down as tolerated  Pulmonology consulted    Hypertensive urgency  Assessment & Plan  Blood pressure 230/103, was noncompliant with amlodipine  Status post IV labetalol in ER  Plan: Restart amlodipine  IV Lasix  IV hydralazine as needed    Acute respiratory failure with hypoxia (HCC)  Assessment & Plan  Secondary to COPD exacerbation, with possible superimposed flash pulmonary edema  Baseline room air  Bilateral wheezes on exam.  Chest x-ray showed diffuse interstitial prominence  Plan: Treatment as above with steroids, antibiotics, bronchodilators, Lasix  COVID-19/RSV/influenza rule out  Transthoracic echo  Wean off oxygen as able    Tobacco abuse- (present on admission)  Assessment & Plan  Smoking cessation counseling. 50+ pack years. Smokes 1-2 packs of cigarellos per day.  Nicotine patch         VTE prophylaxis: enoxaparin ppx    I have performed a physical exam and reviewed and updated ROS and Plan today (3/25/2022). In review of yesterday's note (3/24/2022), there are no changes except as documented above.

## 2022-03-25 NOTE — ASSESSMENT & PLAN NOTE
Smoking cessation counseling. 50+ pack years. Smokes 1-2 packs of cigarellos per day.  Nicotine patch

## 2022-03-25 NOTE — ASSESSMENT & PLAN NOTE
Blood pressure 230/103, was noncompliant with amlodipine  Status post IV labetalol in ER  Plan: Restart amlodipine  IV Lasix  IV hydralazine as needed

## 2022-03-25 NOTE — ED NOTES
Report to Ligia RALPH all questions answered pt updated on plan of care call light within reach bed low and locked

## 2022-03-25 NOTE — ED NOTES
Med rec complete per pt at bedside with son.   Allergies reviewed and updated.   Confirmed patient's home pharmacy preference.    Pt reports no ABX in the last 30 days.

## 2022-03-25 NOTE — H&P
Hospital Medicine History & Physical Note    Date of Service  3/24/2022    Primary Care Physician  Pcp Pt States None    Consultants  none    Specialist Names: n/a    Code Status  full    Chief Complaint  Chief Complaint   Patient presents with   • Shortness of Breath     For around one week. Patient 85% on RA in triage. Placed on 3L O2       History of Presenting Illness  Radha Guerin is a 66 y.o. female with reported history of COPD not on oxygen, hypertension, smoking 1 pack a day who presented 3/24/2022 with complaints of shortness of breath, cough.  Symptoms started 1 week ago as a cold symptoms with runny nose, chills.  In the last 3 days she is experiencing worsening of dyspnea exertion, some orthopnea, frequent cough with green sputum.  She reports intermittent lower extremity edema on and off in the last year.  In ER she noted to desaturate to 88% on room air, was placed on 3 L nasal cannula.  Blood pressure was 230/103, treated with IV labetalol.  Apparently, patient ran out of her amlodipine.  Chest x-ray showed bilateral interstitial prominence, pneumonitis versus edema.  COVID-19 rule out pending    I discussed the plan of care with patient.    Review of Systems  Review of Systems   Constitutional: Negative for chills, fever and weight loss.   HENT: Negative for ear pain, hearing loss and tinnitus.    Eyes: Negative for blurred vision, double vision and photophobia.   Respiratory: Positive for cough, sputum production, shortness of breath and wheezing. Negative for hemoptysis.    Cardiovascular: Negative for chest pain, palpitations and orthopnea.   Gastrointestinal: Negative for heartburn, nausea and vomiting.   Genitourinary: Negative for dysuria, flank pain, frequency and hematuria.   Musculoskeletal: Negative for back pain, joint pain and neck pain.   Skin: Negative for itching and rash.   Neurological: Negative for tremors, speech change, focal weakness and headaches.   Endo/Heme/Allergies:  Negative for environmental allergies and polydipsia. Does not bruise/bleed easily.   Psychiatric/Behavioral: Negative for hallucinations and substance abuse. The patient is not nervous/anxious.        Past Medical History   has a past medical history of Chronic obstructive pulmonary disease (HCC) and Hypertension.    Surgical History   has no past surgical history on file.     Family History     Family history reviewed with patient. There is no family history that is pertinent to the chief complaint.     Social History   reports that she has been smoking. She has been smoking about 1.00 pack per day. She does not have any smokeless tobacco history on file. She reports that she does not drink alcohol and does not use drugs.    Allergies  No Known Allergies    Medications  Prior to Admission Medications   Prescriptions Last Dose Informant Patient Reported? Taking?   albuterol 108 (90 BASE) MCG/ACT Aero Soln inhalation aerosol 3/24/2022 at AM Patient No No   Sig: Inhale 2 Puffs by mouth every 6 hours as needed for Shortness of Breath.   amLODIPine (NORVASC) 10 MG Tab ~1 WEEK at AM Patient No No   Sig: Take 1 Tablet by mouth every day.   guaiFENesin (ROBITUSSIN) 100 MG/5ML Solution 3/24/2022 at 1700 Patient No No   Sig: Take 10 mL by mouth every four hours as needed for Cough.   Patient taking differently: Take  by mouth every four hours as needed for Cough. Pt states takes took large kitchen tablespoon full      Facility-Administered Medications: None       Physical Exam  Temp:  [36.3 °C (97.4 °F)-36.8 °C (98.2 °F)] 36.3 °C (97.4 °F)  Pulse:  [72-97] 89  Resp:  [16] 16  BP: (150-241)/() 236/103  SpO2:  [82 %-96 %] 91 %  Blood Pressure : (!) 236/103   Temperature: 36.3 °C (97.4 °F)   Pulse: 89   Respiration: 16   Pulse Oximetry: 91 %       Physical Exam  Vitals and nursing note reviewed.   Constitutional:       General: She is not in acute distress.     Appearance: Normal appearance.   HENT:      Head:  Normocephalic and atraumatic.      Nose: Nose normal.      Mouth/Throat:      Mouth: Mucous membranes are moist.   Eyes:      Extraocular Movements: Extraocular movements intact.      Pupils: Pupils are equal, round, and reactive to light.   Cardiovascular:      Rate and Rhythm: Normal rate and regular rhythm.   Pulmonary:      Effort: Pulmonary effort is normal.      Breath sounds: Wheezing present.   Abdominal:      General: Abdomen is flat. There is no distension.      Tenderness: There is no abdominal tenderness.   Musculoskeletal:         General: No swelling or deformity. Normal range of motion.      Cervical back: Normal range of motion and neck supple.      Right lower leg: Edema (Trace) present.      Left lower leg: Edema (Trace) present.   Skin:     General: Skin is warm and dry.   Neurological:      General: No focal deficit present.      Mental Status: She is alert and oriented to person, place, and time.   Psychiatric:         Mood and Affect: Mood normal.         Behavior: Behavior normal.         Laboratory:  Recent Labs     03/24/22  1842   WBC 10.5   RBC 5.49*   HEMOGLOBIN 16.9*   HEMATOCRIT 51.4*   MCV 93.6   MCH 30.8   MCHC 32.9*   RDW 45.1   PLATELETCT 239   MPV 10.1     Recent Labs     03/24/22  1842   SODIUM 141   POTASSIUM 4.5   CHLORIDE 104   CO2 25   GLUCOSE 94   BUN 14   CREATININE 0.46*   CALCIUM 9.2     Recent Labs     03/24/22  1842   ALTSGPT 25   ASTSGOT 16   ALKPHOSPHAT 60   TBILIRUBIN 0.2   GLUCOSE 94         Recent Labs     03/24/22  1842   NTPROBNP 445*         Recent Labs     03/24/22  1842   TROPONINT 10       Imaging:  DX-CHEST-PORTABLE (1 VIEW)   Final Result         Diffuse interstitial prominence could relate to fluid overload or atypical infection.          X-Ray:  I have personally reviewed the images and compared with prior images.    Assessment/Plan:  I anticipate this patient will require at least two midnights for appropriate medical management, necessitating inpatient  admission.    * COPD exacerbation (HCC)- (present on admission)  Assessment & Plan  Patient received IV Solu-Medrol and doxycycline in ER  We will continue with prednisone, azithromycin, DuoNeb, Robitussin  RT protocol, supplemental oxygen, wean down as tolerated    Hypertensive urgency  Assessment & Plan  Blood pressure 230/103, was noncompliant with amlodipine  Status post IV labetalol in ER  Plan: Restart amlodipine  IV Lasix  IV hydralazine as needed    Tobacco abuse- (present on admission)  Assessment & Plan  Smoking cessation counseling  Nicotine patch    Acute respiratory failure with hypoxia (HCC)  Assessment & Plan  Secondary to COPD exacerbation, with possible superimposed flash pulmonary edema  Bilateral wheezes on exam.  Chest x-ray showed diffuse interstitial prominence  Plan: Treatment as above with steroids, antibiotics, bronchodilators, Lasix  COVID-19/RSV/influenza rule out  Transthoracic echo  Wean off oxygen as able      VTE prophylaxis: enoxaparin ppx

## 2022-03-25 NOTE — ED PROVIDER NOTES
ED Provider Note    Scribed for Mellissa Pal M.D. by Josue Cortez-Reyes. 3/24/2022  9:40 PM    Primary care provider: Pcp Pt States None  Means of arrival: Walk-in  History obtained from: Patient  History limited by: None  CHIEF COMPLAINT  Chief Complaint   Patient presents with   • Shortness of Breath     For around one week. Patient 85% on RA in triage. Placed on 3L O2       HPI  Radha Guerin is a 66 y.o. female who presents to the ED complaining of cough and shortness of breath which began 5 days ago.  She notes that her shortness of breath increased in severity yesterday prompting her to present to the ED today. Her shortness of breath is exacerbated with walking and when lying flat. She denies any additional fevers, coughing up blood, chest pain, or abdominal pain but endorses lower extremity swelling and a mild headache.  She reports coughing up green phlegm over the last few days.  She has a history of hypertension and notes that she has not taken her medication for the past week as she has been unable to fill her prescription. Her blood pressure is ~230/103 here in the ED but she notes that her blood pressure is normally ~140 systolic when she takes her medications. She states that she smokes. She has a history of COPD but is not on oxygen at baseline. The patient has been vaccinated for COVID.  No ill contacts.    REVIEW OF SYSTEMS  Pertinent positives include shortness of breath, cough, lower extremity swelling, and mild headache.   Pertinent negatives include no fevers, coughing up blood, chest pain, or abdominal pain.   See HPI for further details. All other systems are negative.    PAST MEDICAL HISTORY  Past Medical History:   Diagnosis Date   • Chronic obstructive pulmonary disease (HCC)    • Hypertension        FAMILY HISTORY  No family history on file.    SOCIAL HISTORY  Social History     Tobacco Use   • Smoking status: Current Every Day Smoker     Packs/day: 1.00   Substance Use Topics   •  Alcohol use: No   • Drug use: No      Social History     Substance and Sexual Activity   Drug Use No       SURGICAL HISTORY  History reviewed. No pertinent surgical history.    CURRENT MEDICATIONS  Home Medications     Reviewed by Bernice Godinez, Pharmacy Intern (Pharmacy Intern) on 03/24/22 at 2246  Med List Status: Complete   Medication Last Dose Status   albuterol 108 (90 BASE) MCG/ACT Aero Soln inhalation aerosol 3/24/2022 Active   amLODIPine (NORVASC) 10 MG Tab ~1 WEEK Active   guaiFENesin (ROBITUSSIN) 100 MG/5ML Solution 3/24/2022 Active                ALLERGIES  No Known Allergies    PHYSICAL EXAM  VITAL SIGNS: BP (!) 236/103   Pulse 94   Temp 36.3 °C (97.4 °F) (Temporal)   Resp 16   SpO2 90%      Constitutional: Well developed, well nourished; No acute distress; Non-toxic appearance.   HENT: Normocephalic, atraumatic; Bilateral external ears normal; oropharyngeal examination deferred due to COVID-19 outbreak and lack of oral pharyngeal complaint  Eyes: PERRL, EOMI, Conjunctiva normal. No discharge.   Neck:  Supple, nontender midline; No stridor; No nuchal rigidity.   Lymphatic: No cervical lymphadenopathy noted.   Cardiovascular: Regular rate and rhythm without murmurs, rubs, or gallop.   Thorax & Lungs: Occasional cough, Decreased breath sounds through out with expiratory wheezes, increased respiratory rate. No rales or rhonchi. Nontender chest wall. No crepitus or subcutaneous air  Abdomen: Soft, nontender, bowel sounds normal. No obvious masses; No pulsatile masses; no rebound, guarding, or peritoneal signs.   Skin: Good color; warm and dry without rash or petechia.  Back: Nontender, No CVA tenderness.   Extremities: Distal radial, dorsalis pedis, posterior tibial pulses are equal bilaterally; No edema; Nontender calves or saphenous, No cyanosis, No clubbing.   Musculoskeletal: Trace edema to ankles. Good range of motion in all major joints. No tenderness to palpation or major deformities noted.    Neurologic: Alert & oriented x 4, clear speech.       EKG  12 Lead EKG interpreted by me as noted below    LABS/RADIOLOGY/PROCEDURES  Results for orders placed or performed during the hospital encounter of 03/24/22   Lactic acid (lactate)   Result Value Ref Range    Lactic Acid 0.9 0.5 - 2.0 mmol/L   CBC WITH DIFFERENTIAL   Result Value Ref Range    WBC 10.5 4.8 - 10.8 K/uL    RBC 5.49 (H) 4.20 - 5.40 M/uL    Hemoglobin 16.9 (H) 12.0 - 16.0 g/dL    Hematocrit 51.4 (H) 37.0 - 47.0 %    MCV 93.6 81.4 - 97.8 fL    MCH 30.8 27.0 - 33.0 pg    MCHC 32.9 (L) 33.6 - 35.0 g/dL    RDW 45.1 35.9 - 50.0 fL    Platelet Count 239 164 - 446 K/uL    MPV 10.1 9.0 - 12.9 fL    Neutrophils-Polys 66.20 44.00 - 72.00 %    Lymphocytes 21.00 (L) 22.00 - 41.00 %    Monocytes 10.70 0.00 - 13.40 %    Eosinophils 1.30 0.00 - 6.90 %    Basophils 0.50 0.00 - 1.80 %    Immature Granulocytes 0.30 0.00 - 0.90 %    Nucleated RBC 0.00 /100 WBC    Neutrophils (Absolute) 6.92 2.00 - 7.15 K/uL    Lymphs (Absolute) 2.19 1.00 - 4.80 K/uL    Monos (Absolute) 1.12 (H) 0.00 - 0.85 K/uL    Eos (Absolute) 0.14 0.00 - 0.51 K/uL    Baso (Absolute) 0.05 0.00 - 0.12 K/uL    Immature Granulocytes (abs) 0.03 0.00 - 0.11 K/uL    NRBC (Absolute) 0.00 K/uL   COMP METABOLIC PANEL   Result Value Ref Range    Sodium 141 135 - 145 mmol/L    Potassium 4.5 3.6 - 5.5 mmol/L    Chloride 104 96 - 112 mmol/L    Co2 25 20 - 33 mmol/L    Anion Gap 12.0 7.0 - 16.0    Glucose 94 65 - 99 mg/dL    Bun 14 8 - 22 mg/dL    Creatinine 0.46 (L) 0.50 - 1.40 mg/dL    Calcium 9.2 8.5 - 10.5 mg/dL    AST(SGOT) 16 12 - 45 U/L    ALT(SGPT) 25 2 - 50 U/L    Alkaline Phosphatase 60 30 - 99 U/L    Total Bilirubin 0.2 0.1 - 1.5 mg/dL    Albumin 4.3 3.2 - 4.9 g/dL    Total Protein 7.0 6.0 - 8.2 g/dL    Globulin 2.7 1.9 - 3.5 g/dL    A-G Ratio 1.6 g/dL   ESTIMATED GFR   Result Value Ref Range    GFR (CKD-EPI) 105 >60 mL/min/1.73 m 2   proBrain Natriuretic Peptide, NT   Result Value Ref Range     NT-proBNP 445 (H) 0 - 125 pg/mL   TROPONIN   Result Value Ref Range    Troponin T 10 6 - 19 ng/L   EKG (NOW)   Result Value Ref Range    Report       [facility] Emergency Dept.    Test Date:  2022  Pt Name:    ANMOL SIDDIQUI              Department: ER  MRN:        [id number]                      Room:  Gender:     Female                       Technician: TCM  :        [id number]                   Requested By:ER TRIAGE PROTOCOL  Order #:    [id number]                    Reading MD: Mellissa Pla    Measurements  Intervals                                Axis  Rate:       85                           P:          69  CA:         133                          QRS:        34  QRSD:       95                           T:          73  QT:         361  QTc:        431    Interpretive Statements  Sinus rhythm rate 85  Normal axis  Normal intervals  T waves inverted V2  No ST elevation or depression  Probable left atrial enlargement  Probable left ventricular hypertrophy  Inferior infarct, old  Compared to ECG 2021 22:17:12  Electronically Signed On 3- 22:14:22 PDT by Mellissa Pal             DX-CHEST-PORTABLE (1 VIEW)   Final Result         Diffuse interstitial prominence could relate to fluid overload or atypical infection.          COURSE & MEDICAL DECISION MAKING  Pertinent Labs & Imaging studies reviewed. (See chart for details)    Reviewed patient's old medical records which showed that the patient was seen in December with a rash. She has a history of hypertension and is supposed to be on Norvasc.     9:40 PM - Patient seen and examined at bedside. Discussed plan of care, including plan to have her evaluated for hospitalization. Patient agrees to the plan of care. The patient will be medicated with labetolol, Solu-Medrol, and Duoneb. Ordered for labs and imaging as noted above to evaluate her symptoms.     10:42 PM - Paged Hospitalist.    10:55 PM - Discussed with Dr. Cabezas,  hospitalist on-call, and he will kindly evaluate the patient hospitalization.    Patient presents to the ER complaining of a cough and shortness of breath of the last 5 days.  She is coughing up green phlegm.  She has history of COPD.  She is never been oxygen requiring.  Here in the ER her O2 sats are 82 to 85% on room air.  She is currently saturating low 90s on 3 L.  She has decreased breath sounds throughout with expiratory wheezes.  She was given DuoNeb and steroid.  X-ray reveals findings consistent with diffuse atypical infection versus fluid overload.  Her BNP is in the 400s.  She sounds more wheezy as opposed to crackly.  She has no cardiac history as far she knows.  No complaints of chest pain.  No swelling in her legs.  I given her some doxycycline for possible atypical bacterial infection.  COVID-19 test is negative.  She is vaccinated for COVID.  At this time she is oxygen requiring will need to be hospitalized for what is likely an atypical pulmonary infection with COPD exacerbation.  I spoke with the hospitalist on-call and he will kindly evaluate the patient hospitalization.    CRITICAL CARE  The very real possibilty of a deterioration of this patient's condition required the highest level of my preparedness for sudden, emergent intervention.  I provided critical care services, which included medication orders, frequent reevaluations of the patient's condition and response to treatment, ordering and reviewing test results, and discussing the case with various consultants.  The critical care time associated with the care of the patient was 30 minutes. Review chart for interventions. This time is exclusive of any other billable procedures.       DISPOSITION:  Patient will be hospitalized by Dr. Cabezas in guarded condition.      FINAL IMPRESSION  1. Hypoxia Acute   2. Acute exacerbation of chronic obstructive pulmonary disease (COPD) (HCC) Acute   3. Lower respiratory infection Acute         Rene NELSON  Cortez-Reyes (Scribe), am scribing for, and in the presence of, Mellissa Pal M.D..    Electronically signed by: Josue Cortez-Reyes (Abbiiblakshmi), 3/24/2022    IMellissa M.D. personally performed the services described in this documentation, as scribed by Josue Cortez-Reyes in my presence, and it is both accurate and complete.    This dictation has been created using voice recognition software. The accuracy of the dictation is limited by the abilities of the software. I expect there may be some errors of grammar and possibly content. I made every attempt to manually correct the errors within my dictation. However, errors related to voice recognition software may still exist and should be interpreted within the appropriate context. C.    The note accurately reflects work and decisions made by me.  Mellissa Pal M.D.  3/25/2022  1:30 AM

## 2022-03-26 LAB
ANION GAP SERPL CALC-SCNC: 11 MMOL/L (ref 7–16)
BASOPHILS # BLD AUTO: 0.3 % (ref 0–1.8)
BASOPHILS # BLD: 0.03 K/UL (ref 0–0.12)
BUN SERPL-MCNC: 24 MG/DL (ref 8–22)
CALCIUM SERPL-MCNC: 9.6 MG/DL (ref 8.5–10.5)
CHLORIDE SERPL-SCNC: 104 MMOL/L (ref 96–112)
CO2 SERPL-SCNC: 27 MMOL/L (ref 20–33)
CREAT SERPL-MCNC: 0.53 MG/DL (ref 0.5–1.4)
EOSINOPHIL # BLD AUTO: 0.01 K/UL (ref 0–0.51)
EOSINOPHIL NFR BLD: 0.1 % (ref 0–6.9)
ERYTHROCYTE [DISTWIDTH] IN BLOOD BY AUTOMATED COUNT: 45.6 FL (ref 35.9–50)
GFR SERPLBLD CREATININE-BSD FMLA CKD-EPI: 101 ML/MIN/1.73 M 2
GLUCOSE SERPL-MCNC: 148 MG/DL (ref 65–99)
HCT VFR BLD AUTO: 53.7 % (ref 37–47)
HGB BLD-MCNC: 17.2 G/DL (ref 12–16)
IMM GRANULOCYTES # BLD AUTO: 0.12 K/UL (ref 0–0.11)
IMM GRANULOCYTES NFR BLD AUTO: 1 % (ref 0–0.9)
LYMPHOCYTES # BLD AUTO: 1.8 K/UL (ref 1–4.8)
LYMPHOCYTES NFR BLD: 15.4 % (ref 22–41)
MCH RBC QN AUTO: 30.6 PG (ref 27–33)
MCHC RBC AUTO-ENTMCNC: 32 G/DL (ref 33.6–35)
MCV RBC AUTO: 95.4 FL (ref 81.4–97.8)
MONOCYTES # BLD AUTO: 0.98 K/UL (ref 0–0.85)
MONOCYTES NFR BLD AUTO: 8.4 % (ref 0–13.4)
NEUTROPHILS # BLD AUTO: 8.78 K/UL (ref 2–7.15)
NEUTROPHILS NFR BLD: 74.8 % (ref 44–72)
NRBC # BLD AUTO: 0 K/UL
NRBC BLD-RTO: 0 /100 WBC
PLATELET # BLD AUTO: 223 K/UL (ref 164–446)
PMV BLD AUTO: 10.8 FL (ref 9–12.9)
POTASSIUM SERPL-SCNC: 4.6 MMOL/L (ref 3.6–5.5)
RBC # BLD AUTO: 5.63 M/UL (ref 4.2–5.4)
SODIUM SERPL-SCNC: 142 MMOL/L (ref 135–145)
WBC # BLD AUTO: 11.7 K/UL (ref 4.8–10.8)

## 2022-03-26 PROCEDURE — 94669 MECHANICAL CHEST WALL OSCILL: CPT

## 2022-03-26 PROCEDURE — 94640 AIRWAY INHALATION TREATMENT: CPT

## 2022-03-26 PROCEDURE — 99232 SBSQ HOSP IP/OBS MODERATE 35: CPT | Performed by: INTERNAL MEDICINE

## 2022-03-26 PROCEDURE — 700102 HCHG RX REV CODE 250 W/ 637 OVERRIDE(OP): Performed by: INTERNAL MEDICINE

## 2022-03-26 PROCEDURE — 700111 HCHG RX REV CODE 636 W/ 250 OVERRIDE (IP): Performed by: INTERNAL MEDICINE

## 2022-03-26 PROCEDURE — A9270 NON-COVERED ITEM OR SERVICE: HCPCS | Performed by: INTERNAL MEDICINE

## 2022-03-26 PROCEDURE — 770001 HCHG ROOM/CARE - MED/SURG/GYN PRIV*

## 2022-03-26 PROCEDURE — 36415 COLL VENOUS BLD VENIPUNCTURE: CPT

## 2022-03-26 PROCEDURE — 85025 COMPLETE CBC W/AUTO DIFF WBC: CPT

## 2022-03-26 PROCEDURE — 700101 HCHG RX REV CODE 250: Performed by: STUDENT IN AN ORGANIZED HEALTH CARE EDUCATION/TRAINING PROGRAM

## 2022-03-26 PROCEDURE — 80048 BASIC METABOLIC PNL TOTAL CA: CPT

## 2022-03-26 RX ORDER — FUROSEMIDE 40 MG/1
40 TABLET ORAL
Status: DISCONTINUED | OUTPATIENT
Start: 2022-03-26 | End: 2022-03-27 | Stop reason: HOSPADM

## 2022-03-26 RX ADMIN — AMLODIPINE BESYLATE 10 MG: 5 TABLET ORAL at 04:29

## 2022-03-26 RX ADMIN — IPRATROPIUM BROMIDE AND ALBUTEROL SULFATE 3 ML: 2.5; .5 SOLUTION RESPIRATORY (INHALATION) at 06:57

## 2022-03-26 RX ADMIN — FUROSEMIDE 40 MG: 40 TABLET ORAL at 13:25

## 2022-03-26 RX ADMIN — NICOTINE TRANSDERMAL SYSTEM 21 MG: 21 PATCH, EXTENDED RELEASE TRANSDERMAL at 04:29

## 2022-03-26 RX ADMIN — IPRATROPIUM BROMIDE AND ALBUTEROL SULFATE 3 ML: 2.5; .5 SOLUTION RESPIRATORY (INHALATION) at 10:44

## 2022-03-26 RX ADMIN — FUROSEMIDE 40 MG: 10 INJECTION, SOLUTION INTRAMUSCULAR; INTRAVENOUS at 04:30

## 2022-03-26 RX ADMIN — IPRATROPIUM BROMIDE AND ALBUTEROL SULFATE 3 ML: 2.5; .5 SOLUTION RESPIRATORY (INHALATION) at 14:21

## 2022-03-26 RX ADMIN — PREDNISONE 40 MG: 20 TABLET ORAL at 04:29

## 2022-03-26 RX ADMIN — ENOXAPARIN SODIUM 40 MG: 40 INJECTION SUBCUTANEOUS at 04:29

## 2022-03-26 RX ADMIN — AZITHROMYCIN 500 MG: 250 TABLET, FILM COATED ORAL at 04:29

## 2022-03-26 ASSESSMENT — ENCOUNTER SYMPTOMS
HEADACHES: 0
FLANK PAIN: 0
CHILLS: 0
SPEECH CHANGE: 0
VOMITING: 0
SENSORY CHANGE: 0
ABDOMINAL PAIN: 0
DEPRESSION: 0
MYALGIAS: 0
FEVER: 0
WEAKNESS: 1
NAUSEA: 0
DIZZINESS: 0
SHORTNESS OF BREATH: 1
BACK PAIN: 0
SPUTUM PRODUCTION: 0
WHEEZING: 0
COUGH: 1
MEMORY LOSS: 0
NERVOUS/ANXIOUS: 0
FOCAL WEAKNESS: 0
PALPITATIONS: 0

## 2022-03-26 ASSESSMENT — PAIN DESCRIPTION - PAIN TYPE: TYPE: ACUTE PAIN

## 2022-03-26 NOTE — CARE PLAN
Problem: Bronchoconstriction  Goal: Improve in air movement and diminished wheezing  Description: Target End Date:  2 to 3 days    1.  Implement inhaled treatments  2.  Evaluate and manage medication effects  Outcome: Progressing  Flowsheets (Taken 3/25/2022 2000 by Maria Ines Grant RRT)  Bronchodilator Goals/Outcome: Patient at Stable Baseline  Bronchodilator Indications: History / Diagnosis       Respiratory Update    Treatment modality: SVN  Frequency: QID    Pt tolerating current treatments well with no adverse reactions.

## 2022-03-26 NOTE — PROGRESS NOTES
Hospital Medicine Daily Progress Note    Date of Service  3/26/2022    Chief Complaint  Radha Guerin is a 66 y.o. female admitted 3/24/2022 with COPD exacerbation    Hospital Course  Radha Guerin is a 66 y.o. female with reported history of COPD not on oxygen, hypertension, smoking 1 pack a day who presented 3/24/2022 with complaints of shortness of breath, cough.  Symptoms started 1 week ago as a cold symptoms with runny nose, chills.  In the last 3 days she is experiencing worsening of dyspnea exertion, some orthopnea, frequent cough with green sputum.  She reports intermittent lower extremity edema on and off in the last year.  In ER she noted to desaturate to 88% on room air, was placed on 3 L nasal cannula.  Blood pressure was 230/103, treated with IV labetalol.  Apparently, patient ran out of her amlodipine.  Chest x-ray showed bilateral interstitial prominence, pneumonitis versus edema.  COVID-19 rule out pending    Interval Problem Update  Improving sob, on 2L O2  Min cough    I have personally seen and examined the patient at bedside. I discussed the plan of care with patient and bedside RN.    Consultants/Specialty  pulmonary    Code Status  Full Code    Disposition  Patient is not medically cleared for discharge.   Anticipate discharge to to home with close outpatient follow-up.  I have placed the appropriate orders for post-discharge needs.    Review of Systems  Review of Systems   Constitutional: Negative for chills and fever.   HENT: Negative for congestion and hearing loss.    Respiratory: Positive for cough and shortness of breath. Negative for sputum production and wheezing.    Cardiovascular: Negative for chest pain, palpitations and leg swelling.   Gastrointestinal: Negative for abdominal pain, nausea and vomiting.   Genitourinary: Negative for dysuria and flank pain.   Musculoskeletal: Negative for back pain, joint pain and myalgias.   Neurological: Positive for weakness. Negative for  dizziness, sensory change, speech change, focal weakness and headaches.   Psychiatric/Behavioral: Negative for depression and memory loss. The patient is not nervous/anxious.         Physical Exam  Temp:  [36.2 °C (97.1 °F)-37 °C (98.6 °F)] 36.3 °C (97.3 °F)  Pulse:  [] 75  Resp:  [16-18] 18  BP: (100-131)/(62-68) 131/63  SpO2:  [89 %-96 %] 92 %    Physical Exam  Vitals and nursing note reviewed.   Constitutional:       General: She is not in acute distress.     Appearance: Normal appearance. She is not ill-appearing.   HENT:      Head: Normocephalic and atraumatic.   Eyes:      General:         Right eye: No discharge.         Left eye: No discharge.      Extraocular Movements: Extraocular movements intact.      Pupils: Pupils are equal, round, and reactive to light.   Cardiovascular:      Rate and Rhythm: Normal rate and regular rhythm.      Heart sounds: Normal heart sounds.   Pulmonary:      Effort: Pulmonary effort is normal. Prolonged expiration present. No accessory muscle usage or respiratory distress.      Breath sounds: No stridor. Wheezing present.      Comments: On 4L NC  Abdominal:      General: Abdomen is flat. There is no distension.      Tenderness: There is no abdominal tenderness.   Musculoskeletal:         General: No tenderness.      Right lower leg: No edema.      Left lower leg: No edema.   Skin:     General: Skin is warm and dry.      Coloration: Skin is not jaundiced.   Neurological:      Mental Status: She is alert and oriented to person, place, and time.      Cranial Nerves: No cranial nerve deficit.   Psychiatric:         Mood and Affect: Mood normal.         Behavior: Behavior normal.         Fluids    Intake/Output Summary (Last 24 hours) at 3/26/2022 1159  Last data filed at 3/25/2022 1612  Gross per 24 hour   Intake 240 ml   Output --   Net 240 ml       Laboratory  Recent Labs     03/24/22  1842 03/25/22  0311 03/26/22  0421   WBC 10.5 10.6 11.7*   RBC 5.49* 5.63* 5.63*    HEMOGLOBIN 16.9* 17.3* 17.2*   HEMATOCRIT 51.4* 54.4* 53.7*   MCV 93.6 96.6 95.4   MCH 30.8 30.7 30.6   MCHC 32.9* 31.8* 32.0*   RDW 45.1 46.1 45.6   PLATELETCT 239 220 223   MPV 10.1 10.1 10.8     Recent Labs     03/24/22  1842 03/25/22  0311 03/26/22  0421   SODIUM 141 139 142   POTASSIUM 4.5 4.5 4.6   CHLORIDE 104 102 104   CO2 25 26 27   GLUCOSE 94 145* 148*   BUN 14 14 24*   CREATININE 0.46* 0.50 0.53   CALCIUM 9.2 9.0 9.6                   Imaging  EC-ECHOCARDIOGRAM COMPLETE W/ CONT   Final Result      DX-CHEST-PORTABLE (1 VIEW)   Final Result         Diffuse interstitial prominence could relate to fluid overload or atypical infection.           Assessment/Plan  * COPD exacerbation (HCC)- (present on admission)  Assessment & Plan  Patient received IV Solu-Medrol and doxycycline in ER  We will continue with prednisone, azithromycin, DuoNeb, Robitussin  RT protocol, supplemental oxygen, wean down as tolerated  Pulmonology consulted    Hypertensive urgency  Assessment & Plan  Blood pressure 230/103, was noncompliant with amlodipine  Status post IV labetalol in ER  Plan: Restart amlodipine  IV Lasix  IV hydralazine as needed    Tobacco abuse- (present on admission)  Assessment & Plan  Smoking cessation counseling. 50+ pack years. Smokes 1-2 packs of cigarellos per day.  Nicotine patch    Acute respiratory failure with hypoxia (HCC)  Assessment & Plan  Secondary to COPD exacerbation, with possible superimposed flash pulmonary edema  Baseline room air  Bilateral wheezes on exam.  Chest x-ray showed diffuse interstitial prominence  Plan: Treatment as above with steroids, antibiotics, bronchodilators, Lasix  COVID-19/RSV/influenza rule out  Transthoracic echo  Wean off oxygen as able    3/26 on 3L O2  On lasix  S/p abx  Monitor  On steroids         VTE prophylaxis: enoxaparin ppx    I have performed a physical exam and reviewed and updated ROS and Plan today (3/26/2022). In review of yesterday's note (3/25/2022),  there are no changes except as documented above.

## 2022-03-26 NOTE — CARE PLAN
"  Problem: Knowledge Deficit - Standard  Goal: Patient and family/care givers will demonstrate understanding of plan of care, disease process/condition, diagnostic tests and medications  Outcome: Progressing  Note: Discussed POC and medications with patient.  Patient verbalized understanding.     The patient is Stable - Low risk of patient condition declining or worsening    Shift Goals  Clinical Goals: maintain O2 saturation  Patient Goals: \"eat and shower\"  Family Goals: STEFANIA        "

## 2022-03-27 VITALS
HEART RATE: 67 BPM | DIASTOLIC BLOOD PRESSURE: 69 MMHG | TEMPERATURE: 97 F | OXYGEN SATURATION: 90 % | SYSTOLIC BLOOD PRESSURE: 124 MMHG | BODY MASS INDEX: 27.99 KG/M2 | WEIGHT: 143.3 LBS | RESPIRATION RATE: 18 BRPM

## 2022-03-27 LAB
ANION GAP SERPL CALC-SCNC: 14 MMOL/L (ref 7–16)
BACTERIA UR CULT: NORMAL
BUN SERPL-MCNC: 22 MG/DL (ref 8–22)
CALCIUM SERPL-MCNC: 9.2 MG/DL (ref 8.5–10.5)
CHLORIDE SERPL-SCNC: 99 MMOL/L (ref 96–112)
CO2 SERPL-SCNC: 29 MMOL/L (ref 20–33)
CREAT SERPL-MCNC: 0.72 MG/DL (ref 0.5–1.4)
GFR SERPLBLD CREATININE-BSD FMLA CKD-EPI: 92 ML/MIN/1.73 M 2
GLUCOSE SERPL-MCNC: 179 MG/DL (ref 65–99)
POTASSIUM SERPL-SCNC: 3.9 MMOL/L (ref 3.6–5.5)
SIGNIFICANT IND 70042: NORMAL
SITE SITE: NORMAL
SODIUM SERPL-SCNC: 142 MMOL/L (ref 135–145)
SOURCE SOURCE: NORMAL

## 2022-03-27 PROCEDURE — 700102 HCHG RX REV CODE 250 W/ 637 OVERRIDE(OP): Performed by: INTERNAL MEDICINE

## 2022-03-27 PROCEDURE — 99239 HOSP IP/OBS DSCHRG MGMT >30: CPT | Performed by: INTERNAL MEDICINE

## 2022-03-27 PROCEDURE — 36415 COLL VENOUS BLD VENIPUNCTURE: CPT

## 2022-03-27 PROCEDURE — A9270 NON-COVERED ITEM OR SERVICE: HCPCS | Performed by: INTERNAL MEDICINE

## 2022-03-27 PROCEDURE — 80048 BASIC METABOLIC PNL TOTAL CA: CPT

## 2022-03-27 PROCEDURE — 700111 HCHG RX REV CODE 636 W/ 250 OVERRIDE (IP): Performed by: INTERNAL MEDICINE

## 2022-03-27 RX ORDER — GUAIFENESIN/DEXTROMETHORPHAN 100-10MG/5
10 SYRUP ORAL EVERY 6 HOURS PRN
Qty: 840 ML | Refills: 0 | Status: SHIPPED | OUTPATIENT
Start: 2022-03-27 | End: 2022-03-27

## 2022-03-27 RX ORDER — GUAIFENESIN/DEXTROMETHORPHAN 100-10MG/5
10 SYRUP ORAL EVERY 6 HOURS PRN
Qty: 840 ML | Refills: 0 | Status: SHIPPED | OUTPATIENT
Start: 2022-03-27

## 2022-03-27 RX ORDER — PREDNISONE 5 MG/1
TABLET ORAL
Qty: 7 TABLET | Refills: 0 | Status: SHIPPED | OUTPATIENT
Start: 2022-03-27 | End: 2022-04-02

## 2022-03-27 RX ORDER — FUROSEMIDE 40 MG/1
40 TABLET ORAL 2 TIMES DAILY
Qty: 60 TABLET | Refills: 0 | Status: SHIPPED | OUTPATIENT
Start: 2022-03-27 | End: 2022-03-27

## 2022-03-27 RX ORDER — FUROSEMIDE 40 MG/1
40 TABLET ORAL 2 TIMES DAILY
Qty: 60 TABLET | Refills: 0 | Status: SHIPPED | OUTPATIENT
Start: 2022-03-27

## 2022-03-27 RX ORDER — PREDNISONE 5 MG/1
TABLET ORAL
Qty: 7 TABLET | Refills: 0 | Status: SHIPPED | OUTPATIENT
Start: 2022-03-27 | End: 2022-03-27

## 2022-03-27 RX ADMIN — ENOXAPARIN SODIUM 40 MG: 40 INJECTION SUBCUTANEOUS at 05:28

## 2022-03-27 RX ADMIN — AZITHROMYCIN 500 MG: 250 TABLET, FILM COATED ORAL at 05:28

## 2022-03-27 RX ADMIN — PREDNISONE 40 MG: 20 TABLET ORAL at 05:28

## 2022-03-27 RX ADMIN — AMLODIPINE BESYLATE 10 MG: 5 TABLET ORAL at 05:28

## 2022-03-27 RX ADMIN — NICOTINE TRANSDERMAL SYSTEM 21 MG: 21 PATCH, EXTENDED RELEASE TRANSDERMAL at 06:10

## 2022-03-27 RX ADMIN — FUROSEMIDE 40 MG: 40 TABLET ORAL at 05:29

## 2022-03-27 NOTE — DISCHARGE PLANNING
1249: CM informed the patient that she will not be able to obtain oxygen until tomorrow when the DME company is able to process a credit card payment.  The patient states she wants to leave AMA. CM discouraged the patient from leaving AMA however she wants to leave.  CM notified the nurse via voalte.  CM attempted to reach other DME companies to inquire if they can service the patient.  [employer]: Billing department closed.  [facility]: costs too high for the patient.  [facility]: Billing dept closed.  [facility]: office closed.  [facility]: Billing dept closed.  [employer]: no answer  [facility]: no answer  [facility]: office closed  [facility]: office closed    Anticipated Discharge Disposition: home with oxygen    Action: CM met with the patient to discuss self pay status and requiring oxygen.  The patient understands she has to pay out of pocket for oxygen.  She is okay with CM calling around obtaining prices for cash pay patients. .    [facility] is $550 for the first month then $100/per month thereafter.  [facility]'s business office is closed. [facility] is $185.00.  The patient selected [facility].  Choice sent to the [facility] to send referral.     Barriers to Discharge: securement of oxygen.    Plan: CM will f/u on oxygen referral.

## 2022-03-27 NOTE — DISCHARGE PLANNING
Agency/Facility Name: [facility]    Spoke To: Dyana  Outcome: Patients DME order will not be able ot be delivered until tomorrow since patient is self pay DME Coordinator will have submit  Patients credit card when available     RN CM notified

## 2022-03-27 NOTE — PROGRESS NOTES
"Pt wanted to LEAVE AGAINST MEDICAL ADVICE, she states that she does not want to wait for oxygen to be delivered stating that \"they are playing me\" and \"it's too much\", this RN educated patient that it's best to wait for oxygen DME and that leaving AMA may increase the chances of harm or injury. Another walking oxygen test was performed at time and pt desatted to 86 on room air ambulating.  Patient still wishes to leave AMA. MD messaged over Voalte but pt left before MD can come. Patient told to come back to ER if needed for worsening issues. IV's pulled. Patient denies intentions of harming self or others, AOx4, left floor at 1305.    "

## 2022-03-27 NOTE — CARE PLAN
The patient is Stable - Low risk of patient condition declining or worsening    Shift Goals  Clinical Goals: maintain O2  Patient Goals: rest  Family Goals: STEFANIA    Progress made toward(s) clinical / shift goals:  pt maintaining at 3L    Patient is not progressing towards the following goals:      Problem: Impaired Gas Exchange  Goal: Patient will demonstrate improved ventilation and adequate oxygenation and participate in treatment regimen within the level of ability/situation.  Outcome: Progressing  Flowsheets (Taken 3/26/2022 1652)  Impaired Gas Exchange:   Assesed rate/rhythm/depth of effort of respirations   Assessed oxygenation  Note: Pt on 3L and 93%     Problem: Self Care  Goal: Patient will have the ability to perform ADLs independently or with assistance (bathe, groom, dress, toilet and feed)  Outcome: Progressing

## 2022-03-27 NOTE — CARE PLAN
"The patient is Stable - Low risk of patient condition declining or worsening    Shift Goals  Clinical Goals: maintain adequate oxygen  Patient Goals: \"eat and shower\"  Family Goals: STEFANIA    Progress made toward(s) clinical / shift goals:  plan discharge when home oxygen set up for patient    Patient is not progressing towards the following goals:      "

## 2022-03-27 NOTE — DISCHARGE PLANNING
Received Choice form at 1200  Agency/Facility Name: [facility]   Referral sent per Choice form @ 3585

## 2022-03-27 NOTE — DISCHARGE SUMMARY
Discharge Summary    CHIEF COMPLAINT ON ADMISSION  Chief Complaint   Patient presents with   • Shortness of Breath     For around one week. Patient 85% on RA in triage. Placed on 3L O2       Reason for Admission  Flu-like symptoms     Admission Date  3/24/2022    CODE STATUS  Full Code    HPI & HOSPITAL COURSE  Radha Guerin is a 66 y.o. female with reported history of COPD not on oxygen, hypertension, smoking 1 pack a day who presented 3/24/2022 with complaints of shortness of breath, cough.  Symptoms started 1 week ago as a cold symptoms with runny nose, chills.  In the last 3 days she is experiencing worsening of dyspnea exertion, some orthopnea, frequent cough with green sputum.  She reports intermittent lower extremity edema on and off in the last year.  In ER she noted to desaturate to 88% on room air, was placed on 3 L nasal cannula.  Blood pressure was 230/103, treated with IV labetalol.  Apparently, patient ran out of her amlodipine.  Chest x-ray showed bilateral interstitial prominence, pneumonitis versus edema.  COVID-19 negative.      Patient was treated for pulmonary edema and was on lasix as well as steroids for COPD exacerbation.  She is requiring 2L oxygen supplementation.  Respiratory status improved.  She will discharge to home for pcp f/u.    Addendum: patient did not want to wait for oxygen, recheck of RA sats of 86% with ambulation.  Patient aware of recommendations for oxygen supplementation however patient requesting for AMA discharge without oxygen.  She is alert and oriented x 4 on discharge.    Therefore, she is discharged in good and stable condition to home with close outpatient follow-up.    The patient met 2-midnight criteria for an inpatient stay at the time of discharge.    Discharge Date  3/27/22    FOLLOW UP ITEMS POST DISCHARGE  pcp in 1 week      DISCHARGE DIAGNOSES  Principal Problem:    COPD exacerbation (HCC) POA: Yes  Active Problems:    Acute respiratory failure with  hypoxia (HCC) POA: Unknown    Tobacco abuse POA: Yes    Hypertensive urgency POA: Unknown  Resolved Problems:    * No resolved hospital problems. *      FOLLOW UP  Future Appointments   Date Time Provider Department Center   4/29/2022 11:00 AM [facility]RE[facility] None     No follow-up provider specified.    MEDICATIONS ON DISCHARGE     Medication List      START taking these medications      Instructions   furosemide 40 MG Tabs  Commonly known as: LASIX   Take 1 Tablet by mouth 2 times a day.  Dose: 40 mg     guaiFENesin dextromethorphan 100-10 MG/5ML Syrp syrup  Commonly known as: ROBITUSSIN DM   Take 10 mL by mouth every 6 hours as needed for Cough.  Dose: 10 mL     predniSONE 5 MG Tabs  Start taking on: March 27, 2022  Commonly known as: DELTASONE   Take 2 Tablets by mouth every day for 2 days, THEN 1 Tablet every day for 2 days, THEN 0.5 Tablets every day for 2 days.        CONTINUE taking these medications      Instructions   albuterol 108 (90 Base) MCG/ACT Aers inhalation aerosol   Inhale 2 Puffs by mouth every 6 hours as needed for Shortness of Breath.  Dose: 2 Puff     amLODIPine 10 MG Tabs  Commonly known as: NORVASC   Take 1 Tablet by mouth every day.  Dose: 10 mg        STOP taking these medications    guaiFENesin 100 MG/5ML Soln  Commonly known as: ROBITUSSIN            Allergies  No Known Allergies    DIET  Orders Placed This Encounter   Procedures   • Diet Order Diet: Cardiac     Standing Status:   Standing     Number of Occurrences:   1     Order Specific Question:   Diet:     Answer:   Cardiac [6]       ACTIVITY  As tolerated.  Weight bearing as tolerated    CONSULTATIONS  none    PROCEDURES  none    LABORATORY  Lab Results   Component Value Date    SODIUM 142 03/27/2022    POTASSIUM 3.9 03/27/2022    CHLORIDE 99 03/27/2022    CO2 29 03/27/2022    GLUCOSE 179 (H) 03/27/2022    BUN 22 03/27/2022    CREATININE 0.72 03/27/2022        Lab Results   Component Value Date    WBC 11.7 (H)  03/26/2022    HEMOGLOBIN 17.2 (H) 03/26/2022    HEMATOCRIT 53.7 (H) 03/26/2022    PLATELETCT 223 03/26/2022        Total time of the discharge process exceeds 40 minutes.

## 2022-03-27 NOTE — FACE TO FACE
"Face to Face Note  -  Durable Medical Equipment    Aida Brunner D.O. - NPI: [id number]  I certify that this patient is under my care and that they had a durable medical equipment(DME)face to face encounter by myself that meets the physician DME face-to-face encounter requirements with this patient on:    Date of encounter:   Patient:                    MRN:                       YOB: 2022  Radha Guerin  [id number]  1955     The encounter with the patient was in whole, or in part, for the following medical condition, which is the primary reason for durable medical equipment:  CHF    I certify that, based on my findings, the following durable medical equipment is medically necessary:  Oxygen.    HOME O2 Saturation Measurements:(Values must be present for Home Oxygen orders)  Room air sat at rest: 88  Room air sat with amb: 87  With liters of O2: 3, O2 sat at rest with O2: 92  With Liters of O2: 4, O2 sat with amb with O2 : 92  Is the patient mobile?: Yes    My Clinical findings support the need for the above equipment due to:  Hypoxia    Supporting Symptoms: The patient requires supplemental oxygen, as the following interventions have been tried with limited or no improvement: \"Oral and/or IV steroids, \"Ambulation with oximetry and \"Incentive spirometry    If patient feels more short of breath, they can go up to 6 liters per minute and contact healthcare provider.  "
